# Patient Record
Sex: FEMALE
[De-identification: names, ages, dates, MRNs, and addresses within clinical notes are randomized per-mention and may not be internally consistent; named-entity substitution may affect disease eponyms.]

---

## 2019-10-02 NOTE — ULT
EXAM:

Obstetrical ultrasound greater than 14 weeks:



HISTORY:

Size and dates, anatomy



COMPARISON:

None.



FINDINGS:

Single viable intrauterine fetus is noted in transverse maternal right side presentation.

Fetal heart rate equals 143 bpm.

Placenta is anterior.

Cervical length is 6.8 cm.

Amniotic fluid is Within normal limits.



Fetal anatomy:

Visualized fetal brain, 4 chamber heart, chest, three-vessel cord, cord insert, stomach, bladder, kid
neys, spine, and extremity regions are unremarkable.





Fetal biometry:

BPD: 4.4 cm--19 weeks 2 days

Head circumference: 17.0 cm--19 weeks 5 days

Abdominal circumference: 14.0 cm--19 weeks 3 days

Femur length:2.9 cm--19 weeks 0 days



IMPRESSION:

Gestational age by ultrasound:           19 weeks 3 days

MEGAN by ultrasound:                             2/23/2020



Estimated fetal weight:                       278 g







Reported By: Mauri Singh 

Electronically Signed:  10/2/2019 11:57 AM

## 2020-02-13 ENCOUNTER — HOSPITAL ENCOUNTER (INPATIENT)
Dept: HOSPITAL 92 - L&D/OP | Age: 20
LOS: 10 days | Discharge: HOME | End: 2020-02-23
Attending: FAMILY MEDICINE | Admitting: FAMILY MEDICINE
Payer: COMMERCIAL

## 2020-02-13 VITALS — BODY MASS INDEX: 25.7 KG/M2

## 2020-02-13 DIAGNOSIS — E83.42: ICD-10-CM

## 2020-02-13 DIAGNOSIS — D62: ICD-10-CM

## 2020-02-13 DIAGNOSIS — I12.9: ICD-10-CM

## 2020-02-13 DIAGNOSIS — L76.32: ICD-10-CM

## 2020-02-13 DIAGNOSIS — E87.1: ICD-10-CM

## 2020-02-13 DIAGNOSIS — Z3A.38: ICD-10-CM

## 2020-02-13 DIAGNOSIS — O99.89: ICD-10-CM

## 2020-02-13 DIAGNOSIS — N18.3: ICD-10-CM

## 2020-02-13 DIAGNOSIS — N13.30: ICD-10-CM

## 2020-02-13 DIAGNOSIS — N17.9: ICD-10-CM

## 2020-02-13 LAB
ALBUMIN SERPL BCG-MCNC: 3.2 G/DL (ref 3.5–5)
ALP SERPL-CCNC: 464 U/L (ref 40–110)
ALT SERPL W P-5'-P-CCNC: 36 U/L (ref 8–55)
ANION GAP SERPL CALC-SCNC: 16 MMOL/L (ref 10–20)
AST SERPL-CCNC: 39 U/L (ref 5–34)
BILIRUB SERPL-MCNC: 1.1 MG/DL (ref 0.2–1.2)
BUN SERPL-MCNC: 30 MG/DL (ref 7–18.7)
CALCIUM SERPL-MCNC: 8.9 MG/DL (ref 7.8–10.44)
CHLORIDE SERPL-SCNC: 104 MMOL/L (ref 98–107)
CO2 SERPL-SCNC: 21 MMOL/L (ref 22–29)
CREAT CL PREDICTED SERPL C-G-VRATE: 50 ML/MIN (ref 70–130)
GLOBULIN SER CALC-MCNC: 2.8 G/DL (ref 2.4–3.5)
GLUCOSE SERPL-MCNC: 65 MG/DL (ref 70–105)
HBSAG INDEX: 0.23 S/CO (ref 0–0.99)
HGB BLD-MCNC: 11 G/DL (ref 12–16)
MCH RBC QN AUTO: 30.9 PG (ref 27–31)
MCV RBC AUTO: 87.6 FL (ref 78–98)
PLATELET # BLD AUTO: 156 THOU/UL (ref 130–400)
POTASSIUM SERPL-SCNC: 4.1 MMOL/L (ref 3.5–5.1)
PROT UR-MCNC: 26 MG/DL (ref 1–14)
RBC # BLD AUTO: 3.57 MILL/UL (ref 4.2–5.4)
SODIUM SERPL-SCNC: 137 MMOL/L (ref 136–145)
SYPHILIS ANTIBODY INDEX: 0.02 S/CO
WBC # BLD AUTO: 10.4 THOU/UL (ref 4.8–10.8)

## 2020-02-13 PROCEDURE — 85610 PROTHROMBIN TIME: CPT

## 2020-02-13 PROCEDURE — 85384 FIBRINOGEN ACTIVITY: CPT

## 2020-02-13 PROCEDURE — 74176 CT ABD & PELVIS W/O CONTRAST: CPT

## 2020-02-13 PROCEDURE — 83010 ASSAY OF HAPTOGLOBIN QUANT: CPT

## 2020-02-13 PROCEDURE — C1726 CATH, BAL DIL, NON-VASCULAR: HCPCS

## 2020-02-13 PROCEDURE — 86900 BLOOD TYPING SEROLOGIC ABO: CPT

## 2020-02-13 PROCEDURE — 87077 CULTURE AEROBIC IDENTIFY: CPT

## 2020-02-13 PROCEDURE — 85014 HEMATOCRIT: CPT

## 2020-02-13 PROCEDURE — 87040 BLOOD CULTURE FOR BACTERIA: CPT

## 2020-02-13 PROCEDURE — S0020 INJECTION, BUPIVICAINE HYDRO: HCPCS

## 2020-02-13 PROCEDURE — P9016 RBC LEUKOCYTES REDUCED: HCPCS

## 2020-02-13 PROCEDURE — 88307 TISSUE EXAM BY PATHOLOGIST: CPT

## 2020-02-13 PROCEDURE — 76856 US EXAM PELVIC COMPLETE: CPT

## 2020-02-13 PROCEDURE — 76815 OB US LIMITED FETUS(S): CPT

## 2020-02-13 PROCEDURE — 86850 RBC ANTIBODY SCREEN: CPT

## 2020-02-13 PROCEDURE — 71046 X-RAY EXAM CHEST 2 VIEWS: CPT

## 2020-02-13 PROCEDURE — 86901 BLOOD TYPING SEROLOGIC RH(D): CPT

## 2020-02-13 PROCEDURE — 81001 URINALYSIS AUTO W/SCOPE: CPT

## 2020-02-13 PROCEDURE — 3E033VJ INTRODUCTION OF OTHER HORMONE INTO PERIPHERAL VEIN, PERCUTANEOUS APPROACH: ICD-10-PCS | Performed by: FAMILY MEDICINE

## 2020-02-13 PROCEDURE — 84550 ASSAY OF BLOOD/URIC ACID: CPT

## 2020-02-13 PROCEDURE — 82570 ASSAY OF URINE CREATININE: CPT

## 2020-02-13 PROCEDURE — 84156 ASSAY OF PROTEIN URINE: CPT

## 2020-02-13 PROCEDURE — 80053 COMPREHEN METABOLIC PANEL: CPT

## 2020-02-13 PROCEDURE — 83615 LACTATE (LD) (LDH) ENZYME: CPT

## 2020-02-13 PROCEDURE — 85027 COMPLETE CBC AUTOMATED: CPT

## 2020-02-13 PROCEDURE — 36430 TRANSFUSION BLD/BLD COMPNT: CPT

## 2020-02-13 PROCEDURE — 76770 US EXAM ABDO BACK WALL COMP: CPT

## 2020-02-13 PROCEDURE — 93970 EXTREMITY STUDY: CPT

## 2020-02-13 PROCEDURE — 86780 TREPONEMA PALLIDUM: CPT

## 2020-02-13 PROCEDURE — 76819 FETAL BIOPHYS PROFIL W/O NST: CPT

## 2020-02-13 PROCEDURE — 85018 HEMOGLOBIN: CPT

## 2020-02-13 PROCEDURE — 80048 BASIC METABOLIC PNL TOTAL CA: CPT

## 2020-02-13 PROCEDURE — 85025 COMPLETE CBC W/AUTO DIFF WBC: CPT

## 2020-02-13 PROCEDURE — 82805 BLOOD GASES W/O2 SATURATION: CPT

## 2020-02-13 PROCEDURE — 85730 THROMBOPLASTIN TIME PARTIAL: CPT

## 2020-02-13 PROCEDURE — 36415 COLL VENOUS BLD VENIPUNCTURE: CPT

## 2020-02-13 PROCEDURE — 83735 ASSAY OF MAGNESIUM: CPT

## 2020-02-13 PROCEDURE — 99285 EMERGENCY DEPT VISIT HI MDM: CPT

## 2020-02-13 PROCEDURE — 85060 BLOOD SMEAR INTERPRETATION: CPT

## 2020-02-13 PROCEDURE — 36416 COLLJ CAPILLARY BLOOD SPEC: CPT

## 2020-02-13 PROCEDURE — 87086 URINE CULTURE/COLONY COUNT: CPT

## 2020-02-13 PROCEDURE — 83036 HEMOGLOBIN GLYCOSYLATED A1C: CPT

## 2020-02-13 PROCEDURE — 51702 INSERT TEMP BLADDER CATH: CPT

## 2020-02-13 PROCEDURE — 87340 HEPATITIS B SURFACE AG IA: CPT

## 2020-02-13 NOTE — ULT
RENAL ULTRASOUND:

2/13/20

 

COMPARISON: 

5/14/17.

 

HISTORY: 

Pregnancy. Elevated creatinine.

 

TECHNIQUE:  

Multiplanar gray scale sonographic imaging of the kidneys and urinary bladder obtained. 

 

FINDINGS: 

The right kidney measures approximately 8.9 x 3.2 x 3.4 cm, relatively small. No hydronephrosis is no
jenny on the right. Right renal parenchyma is echogenic, suspicious for possible renal medical disease.
 There are small cysts within the right kidney measuring in the 8-9 mm range. 

 

There is severe left sided hydronephrosis. Left kidney measures 8.8 x 4.5 x 4.3 cm. There is prominen
t dilation of the proximal left ureter. Prevoid urinary bladder volume is 254 mL. Postvoid urinary bl
adder volume is 62 mL. The hydronephrosis and hydroureter on the left persists on the postvoid imagin
g. 

 

IMPRESSION: 

1.      Prominent left sided hydronephrosis and hydroureter seen on pre and postvoid imaging. 

2.      Possible renal medical disease. 

3.      Hydronephrosis on the left discussed with Dr. Marshall at approximately 4:55 p.m., 2/13/20. 

 

 

Code CR 

 

POS: [facility]

## 2020-02-13 NOTE — ULT
FETAL BIOPHYSICAL PROFILE:

2/13/20

 

HISTORY: 

Diabetes, possible intrauterine growth retardation.

 

Please refer to the other findings in the OB report. The fetal biophysical profile score was 8 of a p
ossible 8. Fetal heart rate is 144 beats per minute.

 

IMPRESSION: 

Fetal biophysical profile score of 8 of a possible 8. 

 

POS: [facility]

## 2020-02-13 NOTE — PRG
DATE OF SERVICE:  02/13/2020



TIME OF SERVICE:  2120.



SUBJECTIVE:  Renal ultrasound was obtained on the Ms. Zepeda.  Findings by Dr. Geiger were discussed with myself at 5:00 pm this evening.  The findings include the

right kidney measuring 9 x 3 x 3.5 cm, relatively small with no hydronephrosis.

Echogenic renal parenchyma, suspicious for intrinsic renal disease with small cyst

in the right kidney measuring 8 to 9 mm in greatest diameter.  There was severe

left-sided hydronephrosis with a kidney measuring 8.8 x 4.5 x 4 cm.  There was

prominent dilatation of the proximal left ureter.  Urinary bladder imaging revealed

bilateral ureteral jets ruling out distal obstruction leading to hydronephrosis. 



While hydronephrosis is common in pregnancy, it is more common on the right than on

the patient's left due to pelvic anatomy.  It is unclear as to why the patient has

significant hydronephrosis.  Renal size is about the same as noted in 2017 on a

renal ultrasound when the patient had pyelo, however, the hydronephrosis was not

noted. 



I discussed the patient with Dr. Lela Turner.  We both agreed that the most prudent

course at this point in time would be to proceed with induction of labor for vaginal

delivery and re-evaluate hydronephrosis on the patient's left post delivery.  The

patient has been started on Cytotec at 15 mcg x1 in the vagina.  She has an

unfavorable cervix, but has had significant contractions that precluded a second

dose of Cytotec.  We will continue with Cytotec as allowed per protocol throughout

the night and anticipate Pitocin induction on 02/14.  Fetal heart rate tracing

remained category 1. 







Job ID:  [id number]

## 2020-02-13 NOTE — HP
TIME OF ADMISSION:  1500 hours.



REASON FOR ADMISSION:  Elevated blood pressures with proteinuria noted in the office

at 38 weeks gestation with an unfavorable cervix and gestational diabetes. 



HISTORY OF PRESENT ILLNESS:  Ms. Zepeda is a 20-year-old primigravida with EDC of

02/27.  She is seeing Lela Turner since 7 weeks gestation.  Pregnancy has been

complicated by recurrent UTIs, which the patient has a history of urosepsis outside

of pregnancy.  She has also developed diabetes with a 1-hour GTT of 235.  The

patient has been intermittently compliant with metformin at 500 b.i.d.  She was

noted in the office to have elevated blood pressures and also have 2+ proteinuria.

She is sent over for further evaluation. 



OB/GYN HISTORY:  As noted.  The patient's initial hematocrit was 37%.  She is not a

cystic fibrosis carrier.  She had abnormal 1-hour GTT and nonreactive hepatitis B.

Hemoglobin A1c has been normal at 4.9.  HIV was negative.  Blood type is O positive.

 Antibody negative.  Pap negative.  Rubella immune.  VDRL nonreactive.  The patient

had a positive urine culture for E coli in July and has a history of urosepsis. 



MEDICAL HISTORY:  The patient had urosepsis back in 2017.  Of note, her creatinine

at that time was up to 1.36, but it got down to about 1.1 after treatment. 



SURGICAL HISTORY:  Tubes and adenoids.



SOCIAL HISTORY:  Denies tobacco, alcohol, or IV drug use.



FAMILY HISTORY:  Noncontributory.



ALLERGIES:  DENIES.



MEDICATIONS:  Prenatal vitamins and Macrobid nightly and metformin.



PHYSICAL EXAMINATION:

GENERAL:   female resting comfortably. 

VITAL SIGNS:  Blood pressure 138/92, pulse 85, respirations 18, and temperature

98.8. 

HEENT:  Within normal limits. 

LUNGS:  Clear to auscultation bilaterally. 

HEART:  Regular rate and rhythm. 

ABDOMEN:  Her fundal height is 35 cm.  FHTs 140s.  Vulva without lesions.  Vaginal

exam deferred.  Cervix reported to be closed, long, and high at Dr. Turner's office. 

EXTREMITIES:  No clubbing, cyanosis, or edema.



DIAGNOSTIC STUDIES:  Ultrasound today revealed a biophysical profile of 8/8.  Fetus

is noted to be vertex presentation with an TOÑO of 18.  Composite gestational age of

35 weeks and 5 days with EFW of 2904, was less than a 50th percentile if not reached

the 5th percentile for IUGR for this gestational age. 



LABORATORY DATA:  Laboratory results include a hematocrit of 31% and a platelet

count of 156.  The patient has noted to have a BUN of 30, a creatinine of 1.68, and

a glucose is 65.  The patient is asymptomatic from this.  AST is slightly elevated

at 39, ALT is within normal limits, and alkaline phosphatase is 264.  The patient's

urine protein to creatinine ratio is approximately 1.3 with creatinine low at 21.15. 



IMPRESSION:  A 38 weeks small for dates gestation with gestational diabetes, on

metformin with elevated creatinine and elevated protein to creatinine ratio,

laboratory findings consistent with dehydration, and mildly elevated blood pressures

without headaches or scotoma. 



PLAN:  Discussed the patient's care of plan with Dr. Turner.  We will admit the

patient.  Bolus 1 L of lactated Ringer's.  Continue at 100 mL an hour.  Check a

24-hour urine and repeat labs in the morning.  We will perform serial blood

pressures on the floor.  We will get renal ultrasound to evaluate kidneys, and keep

the patient on Macrodantin at bedtime for UTI prophylaxis.  We will hold Glucophage

at this time and follow Accu-Cheks q.6 hours.  Dr. Turner will likely manage the

patient beginning 2:14 a.m. 







Job ID:  [id number]

## 2020-02-13 NOTE — ULT
Limited obstetrical ultrasound:

2/13/2020



COMPARISON: None



HISTORY: Possible intrauterine growth retardation, gestational diabetes



TECHNIQUE: The plantar grayscale sonographic imaging of the gravid uterus obtained.



FINDINGS: Single intrauterine gestation present with a vertex presentation. Placenta located anterior
ly with no evidence for placental previa or abruption. Fetal heart rate 144 bpm. Amniotic fluid

index 18.4 cm. Fetal anatomy not assessed on this exam.



Fetal biometry:

Biparietal diameter 8.5 cm 34 weeks 1 day

Head circumference 31.7 cm 35 weeks 5 days

Abdominal circumference 33.4 cm 37 weeks 2 days

Femur length 6.9 cm 35 weeks 3 days



Estimated fetal weight 2904 g +/- 430 g.



Average age based on ultrasound is 35 weeks 5 days. Estimated date of delivery is 3/14/2020.



IMPRESSION: Intrauterine gestation as detailed above. Average age based on ultrasound is 35 weeks 5 d
ays.



Reported By: Jonathan Geiger 

Electronically Signed:  2/13/2020 3:12 PM

## 2020-02-13 NOTE — ULT
EXAM:  US Bladder



DATE:  2/13/2020 12:00 AM



INDICATION:  Evaluate for ureteral jets



COMPARISON:  None.



FINDING:  Left and right ureteral jets are demonstrated on color Doppler imaging.





IMPRESSION:Demonstration of both left and right ureteral jets.



Reported By: Wicho Covarrubias 

Electronically Signed:  2/13/2020 6:14 PM

## 2020-02-14 LAB
ALBUMIN SERPL BCG-MCNC: 2.8 G/DL (ref 3.5–5)
ALP SERPL-CCNC: 402 U/L (ref 40–110)
ALT SERPL W P-5'-P-CCNC: 43 U/L (ref 8–55)
ANION GAP SERPL CALC-SCNC: 17 MMOL/L (ref 10–20)
AST SERPL-CCNC: 47 U/L (ref 5–34)
BILIRUB SERPL-MCNC: 1.4 MG/DL (ref 0.2–1.2)
BUN SERPL-MCNC: 31 MG/DL (ref 7–18.7)
CALCIUM SERPL-MCNC: 8.4 MG/DL (ref 7.8–10.44)
CHLORIDE SERPL-SCNC: 109 MMOL/L (ref 98–107)
CO2 SERPL-SCNC: 19 MMOL/L (ref 22–29)
CREAT 24H UR-MRATE: 850.85 MG/24 HR (ref 710–1650)
CREAT CL PREDICTED SERPL C-G-VRATE: 47 ML/MIN (ref 70–130)
GLOBULIN SER CALC-MCNC: 2.8 G/DL (ref 2.4–3.5)
GLUCOSE SERPL-MCNC: 98 MG/DL (ref 70–105)
POTASSIUM SERPL-SCNC: 4.1 MMOL/L (ref 3.5–5.1)
PROT 24H UR-MRATE: 1155 MG/24 HR (ref ?–300)
PROT UR-MCNC: 30 MG/DL (ref 1–14)
SODIUM SERPL-SCNC: 141 MMOL/L (ref 136–145)
SPECIMEN VOL UR: 3850 ML (ref 600–1600)

## 2020-02-14 RX ADMIN — MAGNESIUM SULFATE HEPTAHYDRATE SCH MLS: 500 INJECTION, SOLUTION INTRAMUSCULAR; INTRAVENOUS at 20:52

## 2020-02-14 RX ADMIN — SODIUM CHLORIDE SCH MLS: 0.9 INJECTION, SOLUTION INTRAVENOUS at 12:09

## 2020-02-14 RX ADMIN — Medication SCH ML: at 20:35

## 2020-02-14 NOTE — PDOC.EVN
Event Note





- Event Note


Event Note: 


Asked to AROM by Dr. Turner.


Comfortable with epidural.


SVE 4/50/-1, vtx.


FHTs stable.


AROM- meconium seen.


IUPC to be placed by Labor RN.

## 2020-02-15 LAB
ALBUMIN SERPL BCG-MCNC: 2 G/DL (ref 3.5–5)
ALBUMIN SERPL BCG-MCNC: 2.4 G/DL (ref 3.5–5)
ALP SERPL-CCNC: 273 U/L (ref 40–110)
ALP SERPL-CCNC: 374 U/L (ref 40–110)
ALT SERPL W P-5'-P-CCNC: 26 U/L (ref 8–55)
ALT SERPL W P-5'-P-CCNC: 39 U/L (ref 8–55)
ANALYZER IN CARDIO: (no result)
ANION GAP SERPL CALC-SCNC: 17 MMOL/L (ref 10–20)
ANION GAP SERPL CALC-SCNC: 17 MMOL/L (ref 10–20)
APTT PPP: 33.1 SEC (ref 22.9–36.1)
AST SERPL-CCNC: 34 U/L (ref 5–34)
AST SERPL-CCNC: 47 U/L (ref 5–34)
BASE EXCESS STD BLDA CALC-SCNC: -6.9 MEQ/L
BASE EXCESS STD BLDV CALC-SCNC: -6.6 MEQ/L
BILIRUB SERPL-MCNC: 1.7 MG/DL (ref 0.2–1.2)
BILIRUB SERPL-MCNC: 1.7 MG/DL (ref 0.2–1.2)
BUN SERPL-MCNC: 24 MG/DL (ref 7–18.7)
BUN SERPL-MCNC: 28 MG/DL (ref 7–18.7)
CALCIUM SERPL-MCNC: 7.4 MG/DL (ref 7.8–10.44)
CALCIUM SERPL-MCNC: 7.8 MG/DL (ref 7.8–10.44)
CHLORIDE SERPL-SCNC: 100 MMOL/L (ref 98–107)
CHLORIDE SERPL-SCNC: 102 MMOL/L (ref 98–107)
CO2 SERPL-SCNC: 17 MMOL/L (ref 22–29)
CO2 SERPL-SCNC: 18 MMOL/L (ref 22–29)
CREAT CL PREDICTED SERPL C-G-VRATE: 40 ML/MIN (ref 70–130)
CREAT CL PREDICTED SERPL C-G-VRATE: 44 ML/MIN (ref 70–130)
FIBRINOGEN PPP-MCNC: 554 MG/DL (ref 253–463)
GLOBULIN SER CALC-MCNC: 1.8 G/DL (ref 2.4–3.5)
GLOBULIN SER CALC-MCNC: 2.6 G/DL (ref 2.4–3.5)
GLUCOSE SERPL-MCNC: 119 MG/DL (ref 70–105)
GLUCOSE SERPL-MCNC: 144 MG/DL (ref 70–105)
HCO3 BLDA-SCNC: 21.4 MEQ/L (ref 22–28)
HCO3 BLDV-SCNC: 20 MEQ/L (ref 22–28)
HGB BLD-MCNC: 6.8 G/DL (ref 12–16)
INR PPP: 1
MAGNESIUM SERPL-MCNC: 9.4 MG/DL (ref 1.6–2.6)
MCH RBC QN AUTO: 31.4 PG (ref 27–31)
MCV RBC AUTO: 89.9 FL (ref 78–98)
MDIFF COMPLETE?: YES
PH BLDV: 7.28 [PH] (ref 7.32–7.43)
PLATELET # BLD AUTO: 131 THOU/UL (ref 130–400)
POLYCHROMASIA BLD QL SMEAR: (no result) (100X)
POTASSIUM SERPL-SCNC: 4 MMOL/L (ref 3.5–5.1)
POTASSIUM SERPL-SCNC: 4.7 MMOL/L (ref 3.5–5.1)
PROTHROMBIN TIME: 13.6 SEC (ref 12–14.7)
RBC # BLD AUTO: 2.15 MILL/UL (ref 4.2–5.4)
RBC UR QL AUTO: (no result) HPF (ref 0–3)
REFLEX FOR REVIEW??: YES
SODIUM SERPL-SCNC: 130 MMOL/L (ref 136–145)
SODIUM SERPL-SCNC: 132 MMOL/L (ref 136–145)
WBC # BLD AUTO: 22.4 THOU/UL (ref 4.8–10.8)
WBC UR QL AUTO: (no result) HPF (ref 0–3)

## 2020-02-15 RX ADMIN — SODIUM CHLORIDE SCH: 0.9 INJECTION, SOLUTION INTRAVENOUS at 19:11

## 2020-02-15 RX ADMIN — MAGNESIUM SULFATE HEPTAHYDRATE SCH MLS: 500 INJECTION, SOLUTION INTRAMUSCULAR; INTRAVENOUS at 05:27

## 2020-02-15 RX ADMIN — ACETAMINOPHEN AND CODEINE PHOSPHATE PRN TAB: 300; 30 TABLET ORAL at 21:07

## 2020-02-15 RX ADMIN — Medication SCH ML: at 06:21

## 2020-02-15 RX ADMIN — MAGNESIUM SULFATE HEPTAHYDRATE SCH: 500 INJECTION, SOLUTION INTRAMUSCULAR; INTRAVENOUS at 19:09

## 2020-02-15 NOTE — ULT
Exam: Bilateral renal ultrasound



HISTORY: Anuria. Elevated creatinine.



COMPARISON: 2/13/2020



FINDINGS: 

Right kidney: Normal cortical echotexture. No hydronephrosis. There is right renal cortical thinning.


Right kidney measurements: 3.7 x 4.0 x 8.3 cm. 

Left kidney: Normal cortical echotexture. Moderate left-sided hydronephrosis.

Left kidney measurements 4.4 x 4.1 x 8.4 cm. 



Urinary bladder: Not be assessed due to bandage overlying the location of the bladder.

Trace amount of fluid in the right upper quadrant is noted





IMPRESSION: 

1. Improved left-sided hydronephrosis. Currently, there is moderate left-sided hydronephrosis.

2. Right renal cortical thinning. Correlate for medical renal disease.

3. Trace amount of fluid in the right upper quadrant.



Transcribed Date/Time: 2/15/2020 7:01 PM



Reported By: Leila Rodriguez 

Electronically Signed:  2/15/2020 7:06 PM

## 2020-02-15 NOTE — OP
DATE OF PROCEDURE:  02/15/2020



PREOPERATIVE DIAGNOSES:  

1. Term intrauterine pregnancy with acute versus chronic renal failure.

2. Gestational hypertension.

3. Gestational diabetes.

4. Nonreassuring fetal heart tones.



POSTOPERATIVE DIAGNOSES:  

1. Term intrauterine pregnancy with acute versus chronic renal failure.

2. Gestational hypertension.

3. Gestational diabetes.

4. Nonreassuring fetal heart tones.

5. Status post delivery.



PROCEDURE PERFORMED:  Primary low-transverse  section.



ASSISTANT:  Ronal Staley MD



ANESTHESIA:  Epidural anesthetic.



COMPLICATIONS:  No complications.



DESCRIPTION OF PROCEDURE:  After adequate spinal anesthetic, the patient had been

placed in the supine position.  A wedge was placed under her right flank.  The

abdomen was prepped and draped in usual sterile technique.  A Pfannenstiel incision

was made in the inferior aspect of the abdomen.  Subcutaneous tissue was opened with

sharp dissection.  Fascia was opened with sharp dissection.  Peritoneum opened with

sharp and blunt dissection.  It was noted that the abdomen was still with a gravid

uterus.  A bladder blade was placed and a low-transverse incision was made on the

uterus.  Noted, moderate meconium fluid.  A viable male infant was delivered from OP

vertex presentation without difficulty.  Infant breathed and cried spontaneously.

Cord was clamped and cut and infant was handed to the care of the Neonatology Team.

A section of the cord was excised and sent for cord gases and additional cord blood

was obtained.  The placenta was delivered manually and appeared intact.  The uterus

was taken external to the abdominal cavity.  Wet lap was placed over the uterus and

a second lap used to wipe and clean the uterus.  Hysterotomy edges were grasped with

ring forceps and the hysterotomy was then closed in one layer in continuous fashion

using 0 Monocryl.  Sponge and instrument counts were correct.  Hemostasis was

adequate.  There was no bleeding from the hysterotomy edges.  Two small bleeders

were cauterized on the bladder flap.  The wound was inspected.  There were no clots

in the gutters.  No additional bleeding.  Noted that there was a buttonhole defect

in the anterior fascia, which was repaired with a figure-of-eight fashion with 0

Monocryl and then the fascia was then closed in continuous fashion using 0 Monocryl.

 Sponge and instrument counts were correct.  Few bleeders were cauterized on the

subcutaneous tissue and this were then closed with 2-0 plain suture in a running

fashion and the skin was closed using staples.  The patient tolerated the procedure

well, to go to recovery room in good condition.  Noted that the baby is a viable

male infant in level I nursery, weight 6 pounds 9 ounces with Apgar 7 at one minute,

9 at five minutes and the patient to go to recovery room and subsequently LICU to

continue magnesium sulfate. 







Job ID:  [id number]

## 2020-02-15 NOTE — CT
Exam: Abdomen CT without contrast

Pelvic CT without contrast



HISTORY: Chronic renal failure. Hydronephrosis. Postpartum patient.



FINDINGS:

Abdomen CT: Small bilateral effusion. Bibasilar consolidation may represent atelectasis.

Heart size is normal. No significant pericardial fluid. Aorta is unremarkable

Limited evaluation of the solid organs by the lack of IV contrast. There is no solid organ abnormalit
y.

Gallbladder is unremarkable

There is free fluid in the abdomen, tracking into the pelvis.

Nonobstructing calculus in the upper pole the right kidney measures 1 mm. No significant right sided 
obstructive uropathy. Moderate left sided hydronephrosis and hydroureter. No evidence of associated

obstructing calculus. Evaluation of the left ureter is limited by decreased visceral fat.

No mesenteric mass, lymphadenopathy. There are small pockets of free air in the lower pelvic mesenter
y, compatible with recent  section.

Limited evaluation of the alimentary canal by the lack of oral contrast. No evidence of bowel obstruc
tion.



Pelvic CT: Heterogeneous, enlarged uterus compatible with a postpartum state. There are postoperative
 changes compatible with a  section. There are pockets of free air and fluid within the

peritoneal cavity as well as within the subcutaneous fat and ventral abdominal musculature. Skin stap
les are noted.

Brown catheter decompresses the urinary bladder.

No lytic or blastic lesions in the osseous structures



IMPRESSION:

1. Findings compatible with recent  section.

2. Free fluid in the abdomen or pelvis.

3. Moderate right-sided hydronephrosis and hydroureter.



Transcribed Date/Time: 2/15/2020 8:03 PM



Reported By: Leila Rodriguez 

Electronically Signed:  2/15/2020 8:25 PM

## 2020-02-16 LAB
ALBUMIN SERPL BCG-MCNC: 2 G/DL (ref 3.5–5)
ALP SERPL-CCNC: 225 U/L (ref 40–110)
ALT SERPL W P-5'-P-CCNC: 19 U/L (ref 8–55)
ANION GAP SERPL CALC-SCNC: 14 MMOL/L (ref 10–20)
APTT PPP: 33.8 SEC (ref 22.9–36.1)
AST SERPL-CCNC: 30 U/L (ref 5–34)
BILIRUB SERPL-MCNC: 0.9 MG/DL (ref 0.2–1.2)
BUN SERPL-MCNC: 31 MG/DL (ref 7–18.7)
CALCIUM SERPL-MCNC: 8 MG/DL (ref 7.8–10.44)
CHLORIDE SERPL-SCNC: 103 MMOL/L (ref 98–107)
CO2 SERPL-SCNC: 19 MMOL/L (ref 22–29)
CREAT CL PREDICTED SERPL C-G-VRATE: 37 ML/MIN (ref 70–130)
GLOBULIN SER CALC-MCNC: 2.4 G/DL (ref 2.4–3.5)
GLUCOSE SERPL-MCNC: 75 MG/DL (ref 70–105)
HGB BLD-MCNC: 5.6 G/DL (ref 12–16)
HGB BLD-MCNC: 7.3 G/DL (ref 12–16)
HGB BLD-MCNC: 8.5 G/DL (ref 12–16)
HGB BLD-MCNC: 8.7 G/DL (ref 12–16)
INR PPP: 1
MCH RBC QN AUTO: 31.3 PG (ref 27–31)
MCV RBC AUTO: 90.8 FL (ref 78–98)
MDIFF COMPLETE?: YES
PLATELET # BLD AUTO: 146 THOU/UL (ref 130–400)
POTASSIUM SERPL-SCNC: 4.9 MMOL/L (ref 3.5–5.1)
PROTHROMBIN TIME: 13 SEC (ref 12–14.7)
RBC # BLD AUTO: 1.77 MILL/UL (ref 4.2–5.4)
SODIUM SERPL-SCNC: 131 MMOL/L (ref 136–145)
WBC # BLD AUTO: 20.3 THOU/UL (ref 4.8–10.8)

## 2020-02-16 RX ADMIN — Medication SCH ML: at 20:55

## 2020-02-16 RX ADMIN — MAGNESIUM SULFATE HEPTAHYDRATE SCH: 500 INJECTION, SOLUTION INTRAMUSCULAR; INTRAVENOUS at 15:42

## 2020-02-16 RX ADMIN — DOCUSATE CALCIUM SCH: 240 CAPSULE, LIQUID FILLED ORAL at 15:42

## 2020-02-16 RX ADMIN — HYDROCODONE BITARTRATE AND ACETAMINOPHEN PRN TAB: 5; 325 TABLET ORAL at 18:12

## 2020-02-16 RX ADMIN — DOCUSATE CALCIUM SCH MG: 240 CAPSULE, LIQUID FILLED ORAL at 21:06

## 2020-02-16 NOTE — CON
DATE OF CONSULTATION:  2020



REASON FOR CONSULTATION:  

1. Renal insufficiency, history of UTI.

2. Decreased urine output, renal insufficiency.



HISTORY OF PRESENT ILLNESS:  Ms. Zepeda is a 20-year-old female, followed by Dr. Turner.  She at 38 weeks, underwent  due to concern for preeclampsia.

Preop urine demonstrated proteinuria.  The patient is hypertensive.  She is 
known to

have renal insufficiency prior to her .  Two days before her 
, her

creatinine is 1.6.  Her baseline creatinine is anywhere from 1.0 to 1.3.  I did

review her previous imaging, dating back to May 2017, in which she had no 
evidence

of hydronephrosis, punctate right renal calculi with creatinine of 1.3, in 
which she

presented with pyelonephritis/urosepsis.  She denies prior history of urologic

assessment.  However, does have family history of renal failure.

 at bedside.  Delivery of a healthy infant boy.  Nephrology has been

consulted.  Subsequently, Urology was consulted due to decreased urine output.  
Her

 urine output did drop to 35 to 50 mL an hour.  Since the events of decreased

urine output, a renal ultrasound was obtained, as well as CT of the abdomen and

pelvis x2 for staging as there is also concern for acute blood loss due to 
postop

anemia of 6.8 and 5.3 this morning.  She is hemodynamically stable.  I reviewed 
all

her imaging back to .  Family at bedside.  I did order a stat transvaginal

abdominal ultrasound to assess for ureteral jets.  The patient currently resting

comfortably. 



PAST MEDICAL HISTORY:  History of E coli urosepsis in May 2017.  On 2019
,

she did have E coli.  Her recent urine culture on  is group B beta

Streptococcus and has been on Macrobid. 



PAST SURGICAL HISTORY:  T and A, recent  in February 15 by Dr. Turner.
  Per

her, surgery was uneventful. 



SOCIAL HISTORY:  Denies illicit drug use.



FAMILY HISTORY:  Positive for end-stage renal disease.



ALLERGIES:  DENIES ALLERGIES.



MEDICATIONS:  She has been taking nightly Macrobid, metformin at home.



PHYSICAL EXAMINATION:

GENERAL:  The patient is in no acute distress.  Appears nontoxic appearing.

Hemodynamically stable. 

VITAL SIGNS:  Stable at 98.7, 18, 97, 96, 114/65.  I's and O's; her decreased 
urine

output of 35 to 50 mL an hour has significantly picked up over the last 6 hours 
of

hourly urine output of 100, 285, 480, 380, 230 per hourly. 



HEART:  Regular rate. 

LUNGS:  Clear. 

ABDOMEN:  Gravid abdomen due to recent  incision, staples are intact. 

GENITOURINARY:  Demonstrates labial edema.  Brown catheter demonstrating clear

yellow urine.  Ultrasound tech is at bedside. 

Extremities no cyanosis clubbing or edema no calf tenderness

Neurologic: No gross focal deficits per se



PERTINENT LABORATORY AND IMAGING DATA:  On postop day #0, white count of 22,

hemoglobin 6.8, previously her hemoglobin was 12, platelet 131.  Recheck H and H

this morning is 5.6, white count 20, platelet 146.  Coagulation profile is

unremarkable.  Fibrinogen is elevated at 726. 



BMP:  Sodium 131; potassium 4.9; creatinine is 2.2, yesterday 2.0, on February 
15

day of , 1.9, on  of 1.7, on  of 1.6.  Her 
baseline

creatinine is 1.0.  When presenting with urosepsis in May 2017, highest 
creatinine

level was 1.3.  Her recent UA straight cath is clear.  No evidence of 
proteinuria.

Previous urine from  does demonstrate 30 proteinuria. 



 2020, renal bladder ultrasound demonstrates bilateral ureteral 
jets.

 Ultrasound demonstrating left hydronephrosis. 

 February 15, 2020, repeat renal ultrasound demonstrates improved left

hydronephrosis, right renal cortical thinning. 



CT  February 15, 2020, finding consistent with recent .  Nonobstructing

right upper pole renal calculi.  There is no evidence of right 

hydroureteronephrosis.  Moderate left hydroureteronephrosis with no evidence of

ureteral calculi, changes consistent with recent  with hemoperitoneum. 



Repeat staging CT obtained by OB Service on 2020 a.m. demonstrates

hyperdense fluid in the left paracolic gutter stable consistent with 
hemoperitoneum,

stable right punctate renal calculi with no evidence of hydronephrosis, left

hydroureteronephrosis as previous. 



IMPRESSION AND PLAN:  

1. Ms. Zepeda is a 20-year-old female, G1, P1 with urologic issues of 
Escherichia

coli urosepsis back in 2017. 

2. History of right punctate renal lithiasis, nonobstructing.

3. Current admission on postop day #1, status post  section due to 
concern

for preeclampsia, proteinuria. 

4. Oliguria, improved with observation.

5. History of left hydronephrosis. 



This is likely physiologic due to her pregnancy as we have documented on

transvaginal and abdominal ultrasound today with bilateral efflux of urine.

Moreover, her urine output is significantly improved over the last few hours,

therefore no surgical intervention is warranted.  Her oliguria and acute renal

insufficiency are likely multifactorial due to recent acute blood loss,

NSAID/Toradol use intraoperatively, likely prerenal as well.  There is no 
surgical

intervention warranted at this time.  Monitor creatinine, continue indwelling 
Brown

catheter.  Due to the patient's clinical status, Dr. Turner plans to transfer

patient to an ICU setting as there was concern for acute blood loss.   will

follow.  Extensive time spent with patient at bedside, reviewing all imaging 
and labs.







Job ID:  [id number]



JOHN

## 2020-02-16 NOTE — CON
DATE OF CONSULTATION:  



HISTORY OF PRESENT ILLNESS:  Izabel Zepeda is a 20-year-old female who was

delivered by  with preeclampsia prior to her delivery.  She has apparently

chronic kidney disease.  She has one small kidney, one kidney with mild

hydronephrosis.  She dropped her hemoglobin from 11 g on the th to 6.8 g yesterday

and 5.6 g this morning.  She had 45% bands on her peripheral smear on the  and

56% bands on her smear today. 



She has had 2 CTs yesterday and today that show peritoneal blood. 



She has no complaints.  She is a very poor historian, would not turn off or put down

her cellphone while I was talking to her, examining her.  She said she feels fine,

but just said "don't push on my belly." 



PAST MEDICAL HISTORY:  Remarkable for:

1. Diabetes with marginal compliance with medication.

2. History of elevated blood pressures and proteinuria when followed as an

outpatient. 

3. History of urinary tract sepsis in 2017 with renal insufficiency noted at that

time. 

She is supposed to be on Macrobid and metformin prior to admission.



REVIEW OF SYSTEMS:  Otherwise negative.



FAMILY HISTORY:  Positive for end-stage renal disease.



PHYSICAL EXAMINATION:

VITAL SIGNS:  Blood pressure is 118/59, heart rate is 100, respiratory rate is 18,

oximetry is 96%, and temperature is 99.7. 

HEENT:  Pupils are equal.  Sclerae are anicteric. 

NECK:  Supple. 

LUNGS:  Clear. 

HEART:  Regular rhythm. 

ABDOMEN:  Soft and tender as expected after . 

EXTREMITIES:  Without asymmetry.



LABORATORY DATA:  White count 20.3, hemoglobin 5.6 and 8.7 after 2 units of packed

cells, platelets 146,000. 



Acute on chronic kidney disease with renal cortical thinning seen in the right

kidney suggesting chronic kidney disease. 



Her hydronephrosis on the left is persistent on ultrasound on the .  Urology is

following. 



Urine culture from yesterday is negative.



IMPRESSION:  

1. Blood loss anemia.

2. Preeclampsia leading to a .

3. Chronic kidney disease with a renal cortical thinning on the right and

hydronephrosis on the left, likely related to her pregnancy.  She has been followed

by Urology. 

Her left shift is a little concerning, but given her recent stresses, she could

entirely de-marginate white cells just related to the stress of her illness.  This

will need to be followed and she becomes febrile.  Cultures with empiric

broad-antimicrobial therapy would be the next step. 



Nephrology has been consulted.  She is being hydrated with saline. 



The Macrodantin probably is not doing a lot at this point in time and could be held.

 We will follow. 



I would recommend serial H and Hs every 4 hours just to make sure she is not

bleeding. 







Job ID:  [id number]

## 2020-02-16 NOTE — PRG
DATE OF SERVICE:  



SUBJECTIVE:  A 20-year-old female being seen for acute kidney injury.  The patient

denied nausea, vomiting. Or chest pain. 



OBJECTIVE:  CONSTITUTIONAL:  On exam, the patient is awake and alert. 

VITAL SIGNS:  Afebrile, pulse 85, breathing 16, and blood pressure 126/74. 

GENERAL APPEARANCE AND MENTAL STATUS:  Fair. 

HEAD/NECK:  Normocephalic.  Atraumatic. 

EYES:  EOMI.  No deformity. 

EARS:  Clear.  No ulcers. 

NOSE:  Intact.  No lesions. 

MOUTH:  Clear.  No discharge. 

THROAT:  Clear.  No exudate. 

LUNGS:  Clear.  No crackles. 

CARDIAC:  S1, S2.  No rub. 

ABDOMEN:  Benign.  Bowel sounds positive. 

GENITALIA/RECTUM:  Brown absent. 

BACK/EXTREMITIES:  Edema 0+. 

NEUROLOGICAL:  Alert and motor intact. 

SKIN:   

LYMPHATICS:



LABORATORY DATA:  Hemoglobin 8.7.  Creatinine 2.2.



ASSESSMENT AND PLAN:  

1. Acute kidney injury with chronic kidney disease, stage 4, most likely due to

progressive chronic ischemic nephropathy in the setting of pregnancy and its

underlying stress.  Continue hydration. 

2. Obstructive uropathy.  Management per Urology.

3. Hypertension, stable.

4. Anemia, stable.

5. Hypoalbuminemia.  Recommend increased protein intake.

6. Hypermagnesemia, improving. 



No indication for dialysis at this time.  The patient is non-oliguric.







Job ID:  [id number]

## 2020-02-16 NOTE — CON
DATE OF CONSULTATION:  02/15/2020



REFERRING PHYSICIAN:  Lela Turner MD.



CHIEF COMPLAINT:  Preeclampsia with acute kidney injury or with anuria.



HISTORY:  The patient is a 20-year-old female who was diagnosed with preeclampsia

with severe features, with gestational diabetes.  The patient's labor course ended

in primary  for nonreassuring fetal heart tones.  Of note, on arrival, the

patient was noted to have elevated creatinine of 1.7 or 1.68.  Postoperatively, the

patient became anuric with a rising creatinine.  Nephrology was consulted and on

evaluation, Dr. Dumas believes the patient has underlying kidney disease with

scarring and strong family history of likely contributing to the patient's current

condition.  The patient had been placed on magnesium postdelivery and was noted to

have, when she became anuric to have, a magnesium level of 8.4.  Magnesium was

discontinued and magnesium had peaked at 9.4, and has been coming down on its own.

Differential at this point in time from an OB-related standpoint would be

preeclampsia with acute kidney disease, which can occur 1% to 3% of the time, HELLP

syndrome, which has an acute kidney injury a little more often at 3% to 10% or acute

fatty liver, rare though is very common to have kidney injury.  Lab work does not

point to acute fatty liver as the patient's coags are normal.  Serum glucose has

been appropriate and her LFTs have been trending down.  HELLP syndrome labs

demonstrate a normal platelet count.  It shows a dropping hemoglobin went from 11.0

at the time of arrival to 6.8 postoperatively.  Of note, the surgery had very well

controlled bleeding with no complications and quantitative blood loss 200 to 300 mL.

 LDH was elevated at 300, does not seem excessively high.  Her total bilirubin is

elevated at 1.7.  On the morning of , the patient's blood counts dropped

further to 5.6, hematocrit 16.1, and platelets had elevated up to 146.  Vital signs

through the night have been stable with blood pressures in the 100s/50s.  Urine

output steady at 30 to 85 mL an hour and clearing.  Repeat CT has been ordered this

morning as well as repeat coags, fibrinogen, uric acid and LDH to look for further

signs of hemolysis.  The patient's total fluid status needs to be evaluated since

time of admission; however, over postoperatively, the patient is positive, we will

take at least a couple of liters.  When CT scan is performed, we will have a better

idea of we need to proceed to surgery for resolution of bleeding.  She is receiving

1 unit of packed red blood cells now and will be kept a close eye on her.  Urology

is planning on coming to evaluate.  Nephrology is following her acute kidney failure

and Dr. Lela Turner, is primary provider for Ms. Zepeda.  Evidence points to

preeclampsia versus HELLP syndrome with preeclampsia being most likely and acute

kidney failure on top of chronic disease that could be associated with her disease

process versus medication usage as the patient received a dose of Toradol in the OR

versus HELLP syndrome versus acute blood loss anemia.  Hopefully, this morning in

short while we will be able to make more sense of what is occurring and make

appropriate decisions. 







Job ID:  [id number]

## 2020-02-16 NOTE — PDOC.EVN
Event Note





- Event Note


Event Note: 





labs this morning sig for h/h down to 5.6/16 from 6.8/19.  platelets stable at 

146 up from 135.  t bili down to 0.9 from 1.7.  creatinine continues to climb. 

2.29 from 2.09.  pt has preeclampsia with acute kidney injury on top of chronic 

kidney disease. uo has been steady at 30-85cc/hr. and clearing.  vitals have 

been stable with bp in 100s/50's pulse in the 80s.  Since attempting to get 

second IV pt has increase in bp and pulse.  this is blood loss from surgery vs 

hemolysis from HELLP.   repeat coags, haptoglobin to eval for hemolysis, ldh, 

uric acid.  repeat CT this morning. PT getting a unit of prbcs. fibrinogen.





estimated total iv fluids since admission is 8 liters.  urine output since 

friday evening is documented at 1699cc. i am not sure the urine out put prior 

to placement of the collazo








currently pt appears to be entering her diuresis phase with uo acutely rising, 

also pointing aware from acute blood loss post operatively. last hour is 245cc 

an hour.

## 2020-02-16 NOTE — CON
DATE OF CONSULTATION:  02/15/2020



REASON FOR CONSULTATION:  Elevated creatinine.



HISTORY OF PRESENTING ILLNESS:  This is a very pleasant 20-year-old female who

presented to the hospital for a delivery.  Her baseline creatinine was 1.6 on

admission, which increased to 1.9.  So I was consulted.  The patient has had

multiple episodes of __________ with acute kidney injury and her renal imaging a few

days ago showed renal scarring on the right side and hydronephrosis on the left

side.  The patient is very somnolent and can give no further history.  The patient's

magnesium level was 7.7 on examination, which has been trending downward.  The

patient is not bradycardic. 



PAST MEDICAL HISTORY:  Significant for chronic kidney disease, a very strong family

history of end-stage renal disease, history of recurrent UTIs, possibly urosepsis,

two episodes of acute kidney injury, history of tubes and adenoids. 



SOCIOECONOMIC HISTORY:  No alcohol or drug use.



FAMILY HISTORY:  Negative for ESRD.



ALLERGIES:  REVIEWED.



MEDICATIONS:  Home medications, reviewed.  Hospital medications, reviewed.



REVIEW OF SYSTEMS:  Unobtainable.  Patient very somnolent.



PHYSICAL EXAMINATION:

See above. 

GENERAL:  Patient is resting. 

VITAL SIGNS:  Afebrile, pulse 75, breathing 16, blood pressure 116/57. 

GENERAL APPEARANCE AND MENTAL STATUS:  Fair. 

HEAD/NECK:  Normocephalic.   Atraumatic. 

EYES:  EOMI.  No deformity. 

EARS:  Clear.  No ulcers. 

NOSE:   Intact.  No lesions. 

MOUTH:  Clear.  No discharge. 

THROAT:  Clear.  No exudate. 

LUNGS:   Clear.  No crackles. 

CARDIAC:   S1, S2.  No rub. 

ABDOMEN:   Benign.  Bowel sounds positive. 

GENITALIA/RECTUM:  Brown absent. 

BACK/EXTREMITIES:  Edema 0+.    

NEUROLOGICAL:  Alert and motor intact.                      

SKIN:   

LYMPHATICS:



LABORATORY DATA:  Reviewed.



ASSESSMENT AND PLAN:  

1. Acute kidney injury with chronic kidney disease, multifactorial.  The patient has

chronic kidney disease stage 3, which is very advanced kidney disease for a young

pregnant female.  So I would recommend changing the IV fluids to normal saline and

also consulting Urology for possible hydronephrosis.  The patient's right kidney

shows chronic scarring, so the patient could have chronic vesicoureteral reflux. 

2. Hypertension, stable.

3. Elevated magnesium level.  Continue hydration and use Lasix as needed.

4. Anuria.  Use Lasix __________.  All the above findings were discussed with the

primary team and the residents as well as other attending physicians.  No indication

for dialysis at this time. 

Please note, the patient was seen at 6 p.m. yesterday after a call from Dr. Turner.







Job ID:  [id number]

## 2020-02-16 NOTE — CT
CT ABDOMEN AND PELVIS WITHOUT CONTRAST:

 

2020

 

HISTORY:

Recent . Concern for bleeding.

 

COMPARISON:

02/15/2020

 

TECHNIQUE:

Axial CT imaging at 5 mm intervals from the lung bases through the pubic symphysis without contrast.

 

Coronal and sagittal reformatted imaging obtained.

 

FINDINGS:

Evaluation of the viscera, bowel and vascular structures and for lymphadenopathy is limited without c
ontrast media. The imaged lung base demonstrate increased linear density within the posteromedial asp
ects of both lower lobes, suggesting volume loss.

 

The liver is grossly unremarkable. There is stable nonspecific distention of the gallbladder. Stable 
small volume free fluid is seen adjacent to the right lobe of the liver, anterolaterally. There is fr
ee fluid inferior to the tip of the right lobe of the liver, some of which is hyperdense, consistent 
with hemoperitoneum. The spleen appears grossly unremarkable. There is hyperdense fluid within the le
ft paracolic gutter, along the course of the descending colon, consistent with hemoperitoneum in this
 region as well. This is stable when compared to the 02/15/2020 exam. Limited assessment of the pancr
eas and bilateral adrenal glands appears unremarkable. Nonspecific poorly assessed/evaluated left-anton
ed hydronephrosis and proximal hydroureter. Punctate nonobstructing stone upper pole right kidney, st
able.

 

Consistent with the patient's history of a recent  section, horizontally oriented cutaneous s
taples are noted inferiorly. Numerous foci of gas noted throughout the rectus abdominis musculature, 
consistent with recent surgical procedure. Brown catheter present within a mildly prominent urinary b
ladder. There is fluid within the urinary bladder and an air-fluid level. Recommend evaluation for Fo
jeny catheter malfunction.

 

The uterus is markedly enlarged and heterogeneous, Internal contents of the uterus are heterogeneous 
and hyperdense with intermixed gas, consistent with a degree of hemorrhage within the endometrial can
al. In the endocervical canal there is expansion with hyperdense material and gas, also consistent wi
th hemorrhage, not significantly changed. There is a hematoma within the pelvis, anterior to the lowe
r uterine segment and superior to the urinary bladder, measuring 9.1 x 3.6 cm, not significantly jefferson
ged. There is free fluid within the pelvis bilaterally with hemorrhage, as evidenced by multifocal ar
eas of hyperdensity within the free pelvic fluid anteriorly, as before. Limited assessment of the bow
el demonstrates no convincing evidence for obstruction. Vascular structures are poorly assessed witho
ut contrast medial.

 

The osseous structures demonstrate no acute findings.

 

IMPRESSION:

Findings consistent with recent  section. There is free fluid in the left upper quadrant, sasha
ateral paracolic gutters, and within the pelvis with intermixed areas of hyperdensity, consistent wit
h extensive stable hemoperitoneum. There is a stable hematoma within the pelvis, anterior to the lowe
r uterine segment and superior to the urinary bladder, measuring 9.1 x 3.6 cm. Additional stable find
ings as detailed above.

 

CODE T

 

POS: [facility]

## 2020-02-16 NOTE — ULT
EXAM: Pelvic ultrasound



HISTORY: Pelvic pain



COMPARISON: None



TECHNIQUE: Multiple grayscale and color Doppler images were obtained in a transabdominal and transvag
inal pelvic ultrasound.



FINDINGS:

Limited visualization urinary bladder was performed. A catheter is seen in the bladder. No focal abno
rmalities seen in the urinary bladder. Both ureteral jets were visualized.



IMPRESSION: Both ureteral jets visualized.



Reported By: Michele Sandoval 

Electronically Signed:  2/16/2020 10:25 AM

## 2020-02-17 LAB
ALBUMIN SERPL BCG-MCNC: 2.5 G/DL (ref 3.5–5)
ALP SERPL-CCNC: 241 U/L (ref 40–110)
ALT SERPL W P-5'-P-CCNC: 14 U/L (ref 8–55)
ANION GAP SERPL CALC-SCNC: 15 MMOL/L (ref 10–20)
ANION GAP SERPL CALC-SCNC: 15 MMOL/L (ref 10–20)
AST SERPL-CCNC: 22 U/L (ref 5–34)
BILIRUB SERPL-MCNC: 0.7 MG/DL (ref 0.2–1.2)
BUN SERPL-MCNC: 33 MG/DL (ref 7–18.7)
BUN SERPL-MCNC: 33 MG/DL (ref 7–18.7)
CALCIUM SERPL-MCNC: 8.4 MG/DL (ref 7.8–10.44)
CALCIUM SERPL-MCNC: 8.8 MG/DL (ref 7.8–10.44)
CHLORIDE SERPL-SCNC: 111 MMOL/L (ref 98–107)
CHLORIDE SERPL-SCNC: 111 MMOL/L (ref 98–107)
CO2 SERPL-SCNC: 18 MMOL/L (ref 22–29)
CO2 SERPL-SCNC: 19 MMOL/L (ref 22–29)
CREAT CL PREDICTED SERPL C-G-VRATE: 39 ML/MIN (ref 70–130)
CREAT CL PREDICTED SERPL C-G-VRATE: 39 ML/MIN (ref 70–130)
GLOBULIN SER CALC-MCNC: 3.2 G/DL (ref 2.4–3.5)
GLUCOSE SERPL-MCNC: 104 MG/DL (ref 70–105)
GLUCOSE SERPL-MCNC: 96 MG/DL (ref 70–105)
HGB BLD-MCNC: 7.4 G/DL (ref 12–16)
HGB BLD-MCNC: 7.8 G/DL (ref 12–16)
HGB BLD-MCNC: 8.5 G/DL (ref 12–16)
MCH RBC QN AUTO: 31.4 PG (ref 27–31)
MCV RBC AUTO: 90.8 FL (ref 78–98)
MDIFF COMPLETE?: YES
PLATELET # BLD AUTO: 212 THOU/UL (ref 130–400)
POLYCHROMASIA BLD QL SMEAR: (no result) (100X)
POTASSIUM SERPL-SCNC: 4.4 MMOL/L (ref 3.5–5.1)
POTASSIUM SERPL-SCNC: 4.5 MMOL/L (ref 3.5–5.1)
RBC # BLD AUTO: 2.71 MILL/UL (ref 4.2–5.4)
SODIUM SERPL-SCNC: 140 MMOL/L (ref 136–145)
SODIUM SERPL-SCNC: 140 MMOL/L (ref 136–145)
WBC # BLD AUTO: 21.4 THOU/UL (ref 4.8–10.8)

## 2020-02-17 RX ADMIN — ACETAMINOPHEN AND CODEINE PHOSPHATE PRN TAB: 300; 30 TABLET ORAL at 18:39

## 2020-02-17 RX ADMIN — Medication SCH: at 09:57

## 2020-02-17 RX ADMIN — ACETAMINOPHEN AND CODEINE PHOSPHATE PRN TAB: 300; 30 TABLET ORAL at 12:04

## 2020-02-17 RX ADMIN — DOCUSATE CALCIUM SCH MG: 240 CAPSULE, LIQUID FILLED ORAL at 09:52

## 2020-02-17 NOTE — PRG
DATE OF SERVICE:  02/17/2020



SUBJECTIVE:  Patient was seen and examined at bedside and overnight events noted. 



Patient denies any shortness of breath or chest pain or palpitation. 



No history of nausea or vomiting or diarrhea or fever or chills or cramps.



OBJECTIVE:  GENERAL:  This is a well-built female, in no apparent distress. 

VITAL SIGNS:  Temperature 98.7.  Heart rate 97.  Respiratory rate 22.  Blood

pressure 140/78. 

HEENT:  Atraumatic, normocephalic.  Oral mucosa is moist 

NECK:  Supple. 

CARDIOVASCULAR:  S1, S2 heard.  Rate and rhythm regular. 

RESPIRATORY:  Clear to auscultation. 

GASTROINTESTINAL:  Abdomen is soft. 

MUSCULOSKELETAL:  No tenderness.  No edema. 

DERMATOLOGIC:  No skin rash. 

NEUROLOGIC:  Alert and awake and oriented X3.  No focal neurologic deficits. Moving

all the extremities. 

PSYCHIATRIC:  Mood and affect normal.



LABORATORY DATA:  Potassium 4.4, BUN is 33, creatinine is 2.14.



ASSESSMENT AND PLAN:  

1. Acute kidney injury on chronic kidney disease stage 3 with a renal function seems

to be stable.  She is making a lot of urine.  We will monitor. 

2. Proteinuria.

3. Obstructive uropathy.  Follow up with Urology.

4. Hypertension.

5. Anemia.

6. Hypoalbuminemia. 

Avoid nephrotoxins and we will monitor.  We will reduce IV fluids 75 mL/h.  We will

follow. 





Job ID:  [id number]

## 2020-02-17 NOTE — PRG
DATE OF SERVICE:  2020



OBJECTIVE AND SUBJECTIVE:  The patient is alert and awake.  Does not appear to 
be in

acute distress.  Vital signs are T-max of 100.2, T current 98, blood pressure

132/86.  I's and O's 2589 out.  Urine output 4980.  Urine output is dilute 
yellow.

She is negative 2.3 L. 



LABORATORY DATA:  Hemoglobin this morning is 7.4.  There is no BMP profile 
ordered

for this morning. 



IMPRESSION AND PLAN:  

1. A 20-year-old female,  1, para 1, postoperative day #2 status post

 due to preeclampsia, proteinuria.  Urologic consultation initially

obtained due to left hydronephrosis, with decreased urine output.  This has

resolved.  Moreover, transvaginal abdominal ultrasound performed yesterday at

bedside demonstrates bilateral efflux.  Continue medical management, the 
patient in

ICU due to postoperative anemia. 

2. History of Escherichia coli urinary tract infection few years ago.  This 
warrants

outpatient workup.  Continue indwelling Brown catheter for now.   will follow. 







Job ID:  [id number]



[facility]

## 2020-02-17 NOTE — PRG
DATE OF SERVICE:  2020



SUBJECTIVE:  Izabel Zepeda has no complaints.  Her hemodynamics have been stable.



OBJECTIVE:  LUNGS:  Clear. 

HEART:  Regular rhythm. 

ABDOMEN:  Still distended.  After , it is nontender. 

EXTREMITIES:  Without edema.



LABORATORY DATA:  White count 21.4.  Her band count is down to 17% today.  She did

have 1% myelocytes on her peripheral smear, but I suspect that this is an acute

phase reaction to her critical illness.  Hemoglobin is 8.5 on her CBC for the manual

differential.  Electrolytes are normal.  Creatinine is stable at 2.14.  Magnesium is

down to 4.1 last night. 



IMPRESSION:  

1. Peripartum hemorrhage.

2. Preeclampsia.

3. Status post  section.

4. Left shift with no clinical indication of an acute infectious process.  This will

need to be followed after she moves out of critical care unit. 



At this point in time, she is stable from a critical care standpoint.  She is stable

to move out of critical care unit.  We will sign off. 







Job ID:  [id number]

## 2020-02-18 LAB
ALBUMIN SERPL BCG-MCNC: 2.4 G/DL (ref 3.5–5)
ALP SERPL-CCNC: 238 U/L (ref 40–110)
ALT SERPL W P-5'-P-CCNC: 12 U/L (ref 8–55)
ANION GAP SERPL CALC-SCNC: 16 MMOL/L (ref 10–20)
AST SERPL-CCNC: 24 U/L (ref 5–34)
BILIRUB SERPL-MCNC: 0.5 MG/DL (ref 0.2–1.2)
BUN SERPL-MCNC: 26 MG/DL (ref 7–18.7)
CALCIUM SERPL-MCNC: 9 MG/DL (ref 7.8–10.44)
CHLORIDE SERPL-SCNC: 104 MMOL/L (ref 98–107)
CO2 SERPL-SCNC: 19 MMOL/L (ref 22–29)
CREAT CL PREDICTED SERPL C-G-VRATE: 47 ML/MIN (ref 70–130)
GLOBULIN SER CALC-MCNC: 3.2 G/DL (ref 2.4–3.5)
GLUCOSE SERPL-MCNC: 119 MG/DL (ref 70–105)
HGB BLD-MCNC: 8.6 G/DL (ref 12–16)
MCH RBC QN AUTO: 31.4 PG (ref 27–31)
MCV RBC AUTO: 90.1 FL (ref 78–98)
MDIFF COMPLETE?: YES
PLATELET # BLD AUTO: 242 THOU/UL (ref 130–400)
POLYCHROMASIA BLD QL SMEAR: (no result) (100X)
POTASSIUM SERPL-SCNC: 4.8 MMOL/L (ref 3.5–5.1)
RBC # BLD AUTO: 2.74 MILL/UL (ref 4.2–5.4)
SODIUM SERPL-SCNC: 134 MMOL/L (ref 136–145)
WBC # BLD AUTO: 19.8 THOU/UL (ref 4.8–10.8)

## 2020-02-18 RX ADMIN — DOCUSATE CALCIUM SCH: 240 CAPSULE, LIQUID FILLED ORAL at 01:21

## 2020-02-18 RX ADMIN — HYDROCODONE BITARTRATE AND ACETAMINOPHEN PRN TAB: 5; 325 TABLET ORAL at 17:53

## 2020-02-18 RX ADMIN — Medication SCH: at 21:25

## 2020-02-18 RX ADMIN — Medication SCH: at 08:36

## 2020-02-18 RX ADMIN — ACETAMINOPHEN AND CODEINE PHOSPHATE PRN TAB: 300; 30 TABLET ORAL at 06:14

## 2020-02-18 RX ADMIN — ACETAMINOPHEN AND CODEINE PHOSPHATE PRN TAB: 300; 30 TABLET ORAL at 10:41

## 2020-02-18 RX ADMIN — DOCUSATE CALCIUM SCH MG: 240 CAPSULE, LIQUID FILLED ORAL at 21:14

## 2020-02-18 RX ADMIN — Medication SCH: at 01:21

## 2020-02-18 RX ADMIN — DOCUSATE CALCIUM SCH MG: 240 CAPSULE, LIQUID FILLED ORAL at 08:19

## 2020-02-18 NOTE — PRG
DATE OF SERVICE:  2020



SUBJECTIVE:  The patient without complaints.  Alert and awake.



OBJECTIVE:  VITAL SIGNS:  Stable.  She is afebrile.  I's and O's 2530 in, 5 L 
out.

She is negative 2.5 L. 



LABORATORY DATA:  AM labs pending.  Creatinine yesterday is 2.1.  Urine culture

negative. 



IMPRESSION:  

1. Ms. Zepeda is a 20-year-old female, G1, P1, status post  section

secondary to proteinuria, concern for preeclampsia, history of an oliguria, with

left hydronephrosis, likely due to physiologic hydronephrosis of pregnancy.

Abdominal transvaginal ultrasound demonstrated bilateral efflux.  Her oliguria 
has

resolved with observation. 

2. Renal insufficiency.

3. History of Escherichia coli urinary tract infection, urosepsis few years ago.

4. Nonobstructing right punctate renal lithiasis.  Discontinue Brown.  The 
patient

maybe incontinent due to high urine output.  Monitor BMP, potassium, magnesium 
also

needs to be monitored.  As an outpatient.   will work her up for possible

vesicoureteral reflux.  This is not indicated at this time. 







Job ID:  [id number]



[facility]

## 2020-02-18 NOTE — PRG
DATE OF SERVICE:  02/18/2020



SUBJECTIVE:  Patient was seen and examined at bedside and overnight events noted. 



Patient denies any shortness of breath or chest pain or palpitation. 



No history of nausea or vomiting or diarrhea or fever or chills or cramps.



OBJECTIVE:  GENERAL:  This is a well-built female, in no apparent distress. 

VITAL SIGNS:  Temperature 98.4.  Heart rate 93.  Respiratory rate 20.  Blood

pressure 136/84. 

HEENT:  Atraumatic, normocephalic.  Oral mucosa is moist 

NECK:  Supple. 

CARDIOVASCULAR:  S1, S2 heard.  Rate and rhythm regular. 

RESPIRATORY:  Clear to auscultation. 

GASTROINTESTINAL:  Abdomen is soft. 

MUSCULOSKELETAL:  No tenderness.  No edema. 

DERMATOLOGIC:  No skin rash. 

NEUROLOGIC:  Alert and awake and oriented X3.  No focal neurologic deficits. Moving

all the extremities. 

PSYCHIATRIC:  Mood and affect normal.



LABORATORY DATA:  Potassium 4.8, BUN is 26, creatinine is 1.78.



ASSESSMENT AND PLAN:  

1. Acute kidney injury, getting better.  Chronic kidney disease, stage 3, monitor.

2. Proteinuria.  The patient was advised to follow with Dr. Dumas as outpatient.

3. Obstructive uropathy, monitor.

4. Hypertension.

5. Anemia.

6. Hypoalbuminemia.

7. Creatinine is better.  Avoid nephrotoxins and need close followup as outpatient.  

She is at high risk for chronic complications.  Need to repeat proteinuria workup

postpartum. 







Job ID:  [id number]

## 2020-02-19 LAB
ALBUMIN SERPL BCG-MCNC: 2.8 G/DL (ref 3.5–5)
ALP SERPL-CCNC: 270 U/L (ref 40–110)
ALT SERPL W P-5'-P-CCNC: 14 U/L (ref 8–55)
ANION GAP SERPL CALC-SCNC: 15 MMOL/L (ref 10–20)
ANISOCYTOSIS BLD QL SMEAR: (no result) (100X)
AST SERPL-CCNC: 21 U/L (ref 5–34)
BILIRUB SERPL-MCNC: 0.7 MG/DL (ref 0.2–1.2)
BUN SERPL-MCNC: 27 MG/DL (ref 7–18.7)
CALCIUM SERPL-MCNC: 9.6 MG/DL (ref 7.8–10.44)
CHLORIDE SERPL-SCNC: 100 MMOL/L (ref 98–107)
CO2 SERPL-SCNC: 25 MMOL/L (ref 22–29)
CREAT CL PREDICTED SERPL C-G-VRATE: 49 ML/MIN (ref 70–130)
GLOBULIN SER CALC-MCNC: 3.5 G/DL (ref 2.4–3.5)
GLUCOSE SERPL-MCNC: 103 MG/DL (ref 70–105)
HGB BLD-MCNC: 10.2 G/DL (ref 12–16)
MCH RBC QN AUTO: 30.5 PG (ref 27–31)
MCV RBC AUTO: 91 FL (ref 78–98)
MDIFF COMPLETE?: YES
PLATELET # BLD AUTO: 363 THOU/UL (ref 130–400)
POLYCHROMASIA BLD QL SMEAR: (no result) (100X)
POTASSIUM SERPL-SCNC: 4.6 MMOL/L (ref 3.5–5.1)
RBC # BLD AUTO: 3.36 MILL/UL (ref 4.2–5.4)
SODIUM SERPL-SCNC: 135 MMOL/L (ref 136–145)
TARGETS BLD QL SMEAR: (no result) (100X)
WBC # BLD AUTO: 22.2 THOU/UL (ref 4.8–10.8)

## 2020-02-19 RX ADMIN — HYDROCODONE BITARTRATE AND ACETAMINOPHEN PRN TAB: 5; 325 TABLET ORAL at 02:02

## 2020-02-19 RX ADMIN — HYDROCODONE BITARTRATE AND ACETAMINOPHEN PRN TAB: 5; 325 TABLET ORAL at 07:25

## 2020-02-19 RX ADMIN — DOCUSATE CALCIUM SCH MG: 240 CAPSULE, LIQUID FILLED ORAL at 09:05

## 2020-02-19 RX ADMIN — HYDROCODONE BITARTRATE AND ACETAMINOPHEN PRN TAB: 5; 325 TABLET ORAL at 16:33

## 2020-02-19 RX ADMIN — Medication SCH: at 09:26

## 2020-02-19 RX ADMIN — HYDROCODONE BITARTRATE AND ACETAMINOPHEN PRN TAB: 5; 325 TABLET ORAL at 21:14

## 2020-02-19 RX ADMIN — DOCUSATE CALCIUM SCH MG: 240 CAPSULE, LIQUID FILLED ORAL at 21:14

## 2020-02-19 NOTE — PRG
DATE OF SERVICE:  2020



SUBJECTIVE:  The patient without complaints, and a.m. labs not yet finalized.  The

patient without complaints, her urine output is 3900 mL.  White count yesterday is

19, hemoglobin 8.6.  Creatinine yesterday is 1.7.  Highest creatinine on this

admission is 2.2. 



IMPRESSION AND PLAN:  

1. A 20-year-old female, status post  section.

2. History of oliguria, resolved, currently with polyuria, renal insufficiency.

3. History of Escherichia coli urinary tract infection, urosepsis in 2017.

4. History of nonobstructing right punctate renal lithiasis.  Monitor BMP. 



From urologic perspective, if her creatinine is stable, we will sign off.  The

patient can follow up with me as an outpatient in few weeks.  Elective workup for

VCUG, which I would prefer in few months as she is postpartum.  No need for

prophylactic antibiotic from urologic perspective. 







Job ID:  [id number]

## 2020-02-19 NOTE — PRG
DATE OF SERVICE:  02/19/2020



SUBJECTIVE:  Patient was seen and examined at bedside and overnight events noted. 



Patient denies any shortness of breath or chest pain or palpitation. 



No history of nausea or vomiting or diarrhea or fever or chills or cramps.



OBJECTIVE:  GENERAL:  This is a well-built female, in no apparent distress. 

VITAL SIGNS:  Temperature 98.3.  Heart rate 109.  Respiratory rate 20.  Blood

pressure 138/89. 

HEENT:  Atraumatic, normocephalic.  Oral mucosa is moist 

NECK:  Supple. 

CARDIOVASCULAR:  S1, S2 heard.  Rate and rhythm regular. 

RESPIRATORY:  Clear to auscultation. 

GASTROINTESTINAL:  Abdomen is soft. 

MUSCULOSKELETAL:  No tenderness.  No edema. 

DERMATOLOGIC:  No skin rash. 

NEUROLOGIC:  Alert and awake and oriented X3.  No focal neurologic deficits. Moving

all the extremities. 

PSYCHIATRIC:  Mood and affect normal.



LABORATORY DATA:  Potassium 4.6, BUN is 27, creatinine is 1.7.



ASSESSMENT AND PLAN:  

1. Acute kidney injury, much better.

2. Chronic kidney disease, stage 3.

3. Proteinuria.

4. Obstructive uropathy.

5. Hypoalbuminemia.

6. Creatinine is better.  Avoid nephrotoxins.

7. Close followup as outpatient.





Job ID:  [id number]

## 2020-02-19 NOTE — RAD
XR Chest Pa   Lat STANDARD



HISTORY: Fever



COMPARISON: None



FINDINGS: The heart size is normal. The lungs are well expanded without focal areas of consolidation,
 pneumothorax or pleural effusions.

There is a plate of linear atelectasis in the left midlung.



IMPRESSION: No evidence of pneumonia.



Reported By: Jl Degroot 

Electronically Signed:  2/19/2020 6:30 PM

## 2020-02-20 LAB
ALBUMIN SERPL BCG-MCNC: 2.7 G/DL (ref 3.5–5)
ALP SERPL-CCNC: 238 U/L (ref 40–110)
ALT SERPL W P-5'-P-CCNC: 20 U/L (ref 8–55)
ANION GAP SERPL CALC-SCNC: 13 MMOL/L (ref 10–20)
AST SERPL-CCNC: 25 U/L (ref 5–34)
BILIRUB SERPL-MCNC: 0.6 MG/DL (ref 0.2–1.2)
BUN SERPL-MCNC: 30 MG/DL (ref 7–18.7)
CALCIUM SERPL-MCNC: 9.6 MG/DL (ref 7.8–10.44)
CHLORIDE SERPL-SCNC: 101 MMOL/L (ref 98–107)
CO2 SERPL-SCNC: 26 MMOL/L (ref 22–29)
CREAT CL PREDICTED SERPL C-G-VRATE: 49 ML/MIN (ref 70–130)
GLOBULIN SER CALC-MCNC: 3.4 G/DL (ref 2.4–3.5)
GLUCOSE SERPL-MCNC: 107 MG/DL (ref 70–105)
HGB BLD-MCNC: 9.3 G/DL (ref 12–16)
MCH RBC QN AUTO: 31 PG (ref 27–31)
MCV RBC AUTO: 88.9 FL (ref 78–98)
MDIFF COMPLETE?: YES
PLATELET # BLD AUTO: 299 THOU/UL (ref 130–400)
POLYCHROMASIA BLD QL SMEAR: (no result) (100X)
POTASSIUM SERPL-SCNC: 4.6 MMOL/L (ref 3.5–5.1)
RBC # BLD AUTO: 3.01 MILL/UL (ref 4.2–5.4)
SODIUM SERPL-SCNC: 135 MMOL/L (ref 136–145)
WBC # BLD AUTO: 26 THOU/UL (ref 4.8–10.8)

## 2020-02-20 RX ADMIN — DOCUSATE CALCIUM SCH MG: 240 CAPSULE, LIQUID FILLED ORAL at 09:36

## 2020-02-20 RX ADMIN — HYDROCODONE BITARTRATE AND ACETAMINOPHEN PRN TAB: 5; 325 TABLET ORAL at 11:32

## 2020-02-20 RX ADMIN — METRONIDAZOLE SCH MLS: 500 INJECTION, SOLUTION INTRAVENOUS at 19:59

## 2020-02-20 RX ADMIN — DOCUSATE CALCIUM SCH MG: 240 CAPSULE, LIQUID FILLED ORAL at 19:52

## 2020-02-20 RX ADMIN — HYDROCODONE BITARTRATE AND ACETAMINOPHEN PRN TAB: 5; 325 TABLET ORAL at 19:53

## 2020-02-20 RX ADMIN — METRONIDAZOLE SCH MLS: 500 INJECTION, SOLUTION INTRAVENOUS at 09:53

## 2020-02-20 NOTE — ULT
EXAM:

Bilateral lower extremity venous Doppler US



HISTORY:

bilateral lower extremity edema and pain



FINDINGS:

Grayscale, color-flow, Doppler evaluation, spectral analysis of the bilateral lower extremities venou
s structures is performed with 2-D imaging. The bilateral common femoral, superficial femoral,

popliteal, posterior tibial, proximal greater saphenous and profunda femoral veins are imaged.



There is normal luminal compressibility, flow, and augmentation in the visualized deep venous structu
res of the bilateral lower extremities.



IMPRESSION:

No evidence of a deep vein thrombosis in either lower extremity.



Reported By: Jl Degroot 

Electronically Signed:  2/20/2020 6:39 PM

## 2020-02-20 NOTE — CT
EXAM:

CT Abdomen Pelvis WO Con



PROVIDED CLINICAL HISTORY:

Postoperative fever. Patient is postpartum since 2/15/2020.



COMPARISON:

2020



FINDINGS:

Evaluation of the vascular structures and parenchymal organs is limited secondary to the lack of intr
avenous contrast.



Bibasilar atelectasis is present.



A nonobstructing superior pole right renal calculus is again seen. Mild left-sided hydronephrosis is 
present with caliectasis on the right. These findings do appear mildly improved when compared to

the prior study.



The liver, spleen, pancreas, and bilateral adrenal glands demonstrate grossly normal nonenhanced CT a
ppearance. There is gas is present urinary bladder which could be related to recent

catheterization. Clinical correlation is suggested.



The uterus remains enlarged likely due to postpartum state. Again noted is heterogeneity within the e
xpected location of the endometrial canal with increased density material as well as multiple gas

foci seen within the endometrial canal. Increased density material is most compatible with hemorrhage
 within the endometrial canal. Additional heterogeneity is likely due to postpartum state and

recent  section. Endometritis cannot be excluded based on CT evaluation.



Fluid and hemorrhage inferior to the right hepatic lobe does appear improved. There is persistent hyp
erdense as well as low-density material seen in the pelvis and adjacent to the uterus suggesting

hemorrhage which is not significantly changed when compared to the prior exam although may be mildly 
improved in the lower pelvis.



There has been improvement in subcutaneous emphysema. Horizontally oriented skin clips with adjacent 
stranding in the lower pelvis is again seen related to patient's prior  section. Gas within

the anterolateral pelvis bilaterally has improved.



No dilated loops of small bowel are seen.



No newly developed fluid collection is appreciated on this exam.



A small to moderate amount of retained fecal material is seen throughout the colon suggesting constip
ation.



No other interval change.



IMPRESSION:



1. Findings which are again compatible with patient's recent  section. Hemorrhage and fluid w
ithin the pelvis is again seen. The degree of hemorrhage is overall similar to prior study to

slightly improved with slight decrease in fluid adjacent to the inferior aspect right hepatic lobe an
d in the lower posterior pelvis.

2. Improvement in subcutaneous emphysema. There has also been improvement in gas within the anterolat
eral aspect of the pelvis bilaterally.

3. Stable enlargement of the uterus related to postpartum state. There is heterogeneous material with
 areas of increased density as well as foci of gas seen within the endometrial canal extending into

the region of the endocervical canal. Findings are probably secondary to postoperative changes, but e
ndometritis cannot be excluded based on this exam.

4. No new fluid collection is appreciated.

5. Stable nonobstructing right renal calculus.

6. Stable mild left hydronephrosis. However, the degree of calyceal dilatation has improved. Calyceal
 dilatation is likely related to the enlarged uterus and distended urinary bladder.



Reported By: Fernando Villalpando 

Electronically Signed:  2020 9:38 AM

## 2020-02-20 NOTE — PRG
DATE OF SERVICE:  02/20/2020



SUBJECTIVE:  Patient was seen and examined at bedside and overnight events noted. 



Patient denies any shortness of breath or chest pain or palpitation. 



No history of nausea or vomiting or diarrhea or fever or chills or cramps.



OBJECTIVE:  GENERAL:  This is a well-built female, in no apparent distress. 

VITAL SIGNS:  Temperature 98.4.  Heart rate 120.  Respiratory rate 20.  Blood

pressure 121/75. 

HEENT:  Atraumatic, normocephalic.  Oral mucosa is moist 

NECK:  Supple. 

CARDIOVASCULAR:  S1, S2 heard.  Rate and rhythm regular. 

RESPIRATORY:  Clear to auscultation. 

GASTROINTESTINAL:  Abdomen is soft. 

MUSCULOSKELETAL:  No tenderness.  No edema. 

DERMATOLOGIC:  No skin rash. 

NEUROLOGIC:  Alert and awake and oriented X3.  No focal neurologic deficits. Moving

all the extremities. 

PSYCHIATRIC:  Mood and affect normal.



LABORATORY DATA:  Potassium 4.6, BUN is 30, creatinine is 1.7.



ASSESSMENT AND PLAN:  

1. Acute kidney injury on chronic kidney disease stage 3.  Renal function is better

and close to baseline. 

2. Proteinuria.  Need close followup as outpatient.

3. Obstructive uropathy.  Follow up with urology.

4. Hypoalbuminemia.

5. Edema, controlled.

6. Hyponatremia.

7. Anemia of chronic disease.

8. Avoid nephrotoxins.  We will monitor renal function.  We will follow.







Job ID:  [id number]

## 2020-02-21 LAB
ALBUMIN SERPL BCG-MCNC: 2.8 G/DL (ref 3.5–5)
ALP SERPL-CCNC: 243 U/L (ref 40–110)
ALT SERPL W P-5'-P-CCNC: 18 U/L (ref 8–55)
ANION GAP SERPL CALC-SCNC: 19 MMOL/L (ref 10–20)
AST SERPL-CCNC: 21 U/L (ref 5–34)
BILIRUB SERPL-MCNC: 0.6 MG/DL (ref 0.2–1.2)
BUN SERPL-MCNC: 31 MG/DL (ref 7–18.7)
CALCIUM SERPL-MCNC: 9.8 MG/DL (ref 7.8–10.44)
CHLORIDE SERPL-SCNC: 100 MMOL/L (ref 98–107)
CO2 SERPL-SCNC: 20 MMOL/L (ref 22–29)
CREAT CL PREDICTED SERPL C-G-VRATE: 50 ML/MIN (ref 70–130)
GLOBULIN SER CALC-MCNC: 3.7 G/DL (ref 2.4–3.5)
GLUCOSE SERPL-MCNC: 114 MG/DL (ref 70–105)
POTASSIUM SERPL-SCNC: 3.8 MMOL/L (ref 3.5–5.1)
SODIUM SERPL-SCNC: 135 MMOL/L (ref 136–145)

## 2020-02-21 RX ADMIN — DOCUSATE CALCIUM SCH MG: 240 CAPSULE, LIQUID FILLED ORAL at 07:57

## 2020-02-21 RX ADMIN — Medication SCH ML: at 21:20

## 2020-02-21 RX ADMIN — METRONIDAZOLE SCH MLS: 500 INJECTION, SOLUTION INTRAVENOUS at 07:55

## 2020-02-21 RX ADMIN — MEROPENEM AND SODIUM CHLORIDE SCH MLS: 1 INJECTION, SOLUTION INTRAVENOUS at 11:43

## 2020-02-21 RX ADMIN — DOCUSATE CALCIUM SCH MG: 240 CAPSULE, LIQUID FILLED ORAL at 21:19

## 2020-02-21 RX ADMIN — LINEZOLID SCH MLS: 600 INJECTION, SOLUTION INTRAVENOUS at 21:20

## 2020-02-21 NOTE — CON
DATE OF CONSULTATION:  2020



REASON FOR CONSULTATION:  Complications following a  with fever and

leukocytosis. 



HISTORY OF PRESENT ILLNESS:  A 20-year-old who has a history of renal 
insufficiency

and 38 weeks gestation with proteinuria and gestational diabetes.  She was 
initially

seen by Dr. Marshall because of elevated blood pressure and proteinuria noticed 
in the

office.  The patient was given IV fluids and serial blood pressures were 
monitored.

The patient was kept on Macrodantin and renal ultrasound was completed and it 
showed

a relatively small right kidney with no obstruction and moderate to severe

left-sided hydronephrosis with prominent dilation of proximal left ureter.  It 
was

observed that typically in pregnancy, the right side is more commonly affected 
by

this finding, which would raise the possibility of an alternate problem.  So

decision was made for induction of labor on .  The meconium was

detected.  On the , the patient had a low transverse  section by Dr. Tunrer.  The procedure was reviewed and nothing appeared to be out of the 
ordinary

process.  On , there was a decrease in the hemoglobin to 5.6, 
platelets

were normal.  Creatinine went up to 2.29.  The patient had a pelvic transvaginal

ultrasound which showed limited visualization of urinary bladder.  Both ureteral

jets were visualized.  An abdomen and pelvis CT was completed and it showed free

fluid in the left upper quadrant, bilateral paracolic gutters, intermixed areas 
of

hyperdensity consistent with extensive hemoperitoneum.  A stable hematoma 
within the

pelvis measuring 9 x 3.6 cm.  Consultation with Urology was obtained and Dr. Agrawal noticed improvement in urine output.  No surgical intervention was

recommended at the time.  Dr. Dumas was consulted and his impression was acute on

chronic kidney disease, multifactorial.  Some changes were made on the IV 
fluids.

Pulmonary consultation was obtained and this was mostly concerning the changes 
in

the peripheral blood picture.  A venogram did not demonstrate any evidence of 
deep

vein thrombosis.  Throughout the hospital stay, there was a persistence of

leukocytosis with worsening up to 26,000 yesterday.  This was associated with

bandemia which peaked at 38% and now is down to 18%.  Currently, Ms. Zepeda is

awake.  She appears to be concerned particularly with going home.  Apparently, 
her

child is at home.  She denies any headaches.  No visual symptoms.  No shortness 
of

breath or chest pain.  She does have a moderate to severe lower abdominal

tenderness.  She is voiding without difficulty.  She is able to move extremities

well. 



PAST MEDICAL HISTORY:  There is a history of urinary infection in 2017 with 
sepsis

reportedly and chronic renal insufficiency of unclear etiology.  She may have

vesicoureteral reflux with chronic pyelonephritis, but that is not clarified in 
the

history. 



PAST SURGICAL HISTORY:  Adenoidectomy and tympanostomy tubes.



SOCIAL HISTORY:  Never smoker.  She lives in the area with a boyfriend.  
Apparently,

she is estranged from her father.  Does not have a lot a family support. 



FAMILY HISTORY:  Includes diabetes mellitus type 2 and some form of renal

insufficiency. 



ALLERGIES:  NONE.



CURRENT MEDICATIONS:  She had been on

1. Rocephin.

2. Flagyl.

3. Tylenol.

4. Dulcolax.

5. Benadryl.



PHYSICAL EXAMINATION:

VITAL SIGNS:  T-max 101.2.  She was 101.4 the day before.  /80, pulse 113,

respirations 20, O2 saturation 98. 

GENERAL:  She is thin.  Does not appear in distress.  Ocular movements 
conjugate.

No lymphadenopathy.  The  incision appears normal. 

NECK:  Supple.  No jugular vein distention. 

LUNGS:  Symmetric air entry. 

HEART:  S1-S2 regular rate.  No S3 or S4. 

ABDOMEN:  Soft with tenderness which is marked in the lower abdominal area in 
the

suprapubic region.  This tenderness is to palpation and as well as percussion. 

EXTREMITY:  There is no joint inflammatory activity.  No edema.  Pulses 1+ in

dorsalis pedis.  She moves extremities equally. 

NEURO:  She is awake, oriented, follows commands.



LABORATORY DATA:  White cell count is up to 26,000, hemoglobin 9.3, platelets 
299,

18% bands, 71% neutrophils.  Sodium 135, creatinine 1.67.  Liver profile with 
normal

transaminases and bilirubin.  Alkaline phosphatase as expected moderately 
elevated.

Albumin is down to 2.8.  Urinalysis with normal findings from February 15 and

hepatitis surface antigen, syphilis antibody testing negative.  Microbiology, 
have

one urine sample with low numbers of group G strep.  This is of clean-catch and

probably represents contamination of the sample.  The two sets of blood cultures
, no

growth at 48 hours. Urine culture no growth at 48 hours as well.  Pathology of 
the

placenta was not particularly remarkable.  There was evidence of meconium 
staining. 



ASSESSMENT:  

1. History of prior urinary tract infection, possible vesicoureteral ureteral 
reflux

with chronic renal insufficiency, gestational diabetes, hydronephrosis. 

2. Hematoma in the abdominal area as well as in the pelvic region with 
tenderness on

palpation as well as hemoperitoneum

3. Neutrophilia with bandemia.

4. Fever.



DISCUSSION:  The differential diagnosis includes infected hematoma with the 
usual

pathogens including Staphylococcus aureus/MRSA, gram negatives including 
resistant

gram negatives, anaerobes and streptococci as well as mycoplasma species.  
Hematomas

can cause fever without superimposed infection, but she does have persistence of

neutrophilia with bandemia, until proven otherwise, we have to treat this is as 
an

infected process.  The urinary tract infection with pyelonephritis is not ruled 
out

but appears to be less likely in view of the normal urinalysis. An auto-immune 
process as

well as microangiopathy with hemolysis are not apparent.  At this point, we

will transition to a broad-spectrum coverage to include MDRO's in the coverage 
and

observe clinical response.  May need follow up imaging studies.  The endpoint 
here

would be improvement and resolution of the pain as well as improvement of the

neutrophil count and differential.  Once there is improvement, then transition 
to

oral antimicrobial therapy would be attempted in preparation for discharge 
planning. 







Job ID:  [id number]



[facility]

## 2020-02-21 NOTE — PRG
DATE OF SERVICE:  02/21/2020



SUBJECTIVE:  Patient was seen and examined at bedside and overnight events noted. 



Patient denies any shortness of breath or chest pain or palpitation. 



No history of nausea or vomiting or diarrhea or fever or chills or cramps.



OBJECTIVE:  GENERAL:  This is a well-built female, in no apparent distress. 

VITAL SIGNS:  Temperature 99.5.  Heart rate 124.  Respiratory rate 20.  Blood

pressure 120/70. 

HEENT:  Atraumatic, normocephalic.  Oral mucosa is moist. 

NECK:  Supple. 

CARDIOVASCULAR:  S1, S2 heard.  Rate and rhythm regular. 

RESPIRATORY:  Clear to auscultation. 

GASTROINTESTINAL:  Abdomen is soft. 

MUSCULOSKELETAL:  No tenderness.  No edema. 

DERMATOLOGIC:  No skin rash. 

NEUROLOGIC:  Alert and awake and oriented x3.  No focal neurologic deficits.  Moving

all the extremities. 

PSYCHIATRIC:  Mood and affect normal.



LABORATORY DATA:  Potassium 3.8, BUN is 31, and creatinine is 1.67.



ASSESSMENT AND PLAN:  

1. Acute kidney injury, much better.

2. Chronic kidney disease, stage 3.

3. Proteinuria.

4. Urinary tract infection.

5. Edema.

6. Hyponatremia.

7. Anemia of chronic disease. 



Renal function is stable.







Job ID:  [id number]

## 2020-02-22 LAB
ANION GAP SERPL CALC-SCNC: 15 MMOL/L (ref 10–20)
BUN SERPL-MCNC: 28 MG/DL (ref 7–18.7)
CALCIUM SERPL-MCNC: 9.6 MG/DL (ref 7.8–10.44)
CHLORIDE SERPL-SCNC: 101 MMOL/L (ref 98–107)
CO2 SERPL-SCNC: 21 MMOL/L (ref 22–29)
CREAT CL PREDICTED SERPL C-G-VRATE: 57 ML/MIN (ref 70–130)
GLUCOSE SERPL-MCNC: 114 MG/DL (ref 70–105)
HGB BLD-MCNC: 9.9 G/DL (ref 12–16)
MCH RBC QN AUTO: 31.2 PG (ref 27–31)
MCV RBC AUTO: 89.5 FL (ref 78–98)
MDIFF COMPLETE?: YES
PLATELET # BLD AUTO: 518 THOU/UL (ref 130–400)
POLYCHROMASIA BLD QL SMEAR: (no result) (100X)
POTASSIUM SERPL-SCNC: 3.4 MMOL/L (ref 3.5–5.1)
RBC # BLD AUTO: 3.15 MILL/UL (ref 4.2–5.4)
SODIUM SERPL-SCNC: 134 MMOL/L (ref 136–145)
WBC # BLD AUTO: 32.1 THOU/UL (ref 4.8–10.8)

## 2020-02-22 RX ADMIN — Medication SCH ML: at 09:58

## 2020-02-22 RX ADMIN — Medication SCH ML: at 21:28

## 2020-02-22 RX ADMIN — HYDROCODONE BITARTRATE AND ACETAMINOPHEN PRN TAB: 5; 325 TABLET ORAL at 08:34

## 2020-02-22 RX ADMIN — MEROPENEM AND SODIUM CHLORIDE SCH MLS: 1 INJECTION, SOLUTION INTRAVENOUS at 11:50

## 2020-02-22 RX ADMIN — MEROPENEM AND SODIUM CHLORIDE SCH MLS: 1 INJECTION, SOLUTION INTRAVENOUS at 01:22

## 2020-02-22 RX ADMIN — LINEZOLID SCH MLS: 600 INJECTION, SOLUTION INTRAVENOUS at 21:28

## 2020-02-22 RX ADMIN — DOCUSATE CALCIUM SCH: 240 CAPSULE, LIQUID FILLED ORAL at 08:43

## 2020-02-22 RX ADMIN — LINEZOLID SCH MLS: 600 INJECTION, SOLUTION INTRAVENOUS at 08:37

## 2020-02-22 RX ADMIN — DOCUSATE CALCIUM SCH: 240 CAPSULE, LIQUID FILLED ORAL at 21:20

## 2020-02-22 NOTE — PDOC.PP
Post Partum Progress Note


Post Partum Day #: 6


Subjective: 





States feeling ok


PO intake tolerated: yes


Flatus: yes


Ambulation: yes


 Vital Signs (12 hours)











  Temp Pulse Resp BP BP Pulse Ox


 


 20 05:53   124 H   118/68  


 


 20 00:30  99.4 F  128 H  28 H   127/79  98


 


 20 22:38   128 H   129/74  


 


 20 19:10       98


 


 20 19:06  99.1 F  128 H  20   128/74  98








 Weight











Weight                         132 lb











 Most Recent Monitor Data











Heart Rate from ECG            104


 


NIBP                           162/108


 


NIBP BP-Mean                   126


 


Respiration from ECG           29


 


SpO2                           98











Last temp was 100.4 on 20 at 1919





- Physical Examination


General: NAD


Cardiovascular: no m/r/g


Abdominal: + bowel sounds, no distention, appropriately TTP


Extremities: negative homans (B)


Skin: CS incision dry & intact (staples in use...C/D/I)


Neurological: no gross focal deficits


Psychiatric: A&Ox3, normal affect


Result Diagrams: 


 20 06:25





 20 05:35


Additional Labs: 


 Post Partum Labs











Blood Type  O POSITIVE   20  12:47    


 


Hep Bs Antigen  Non-Reactive S/CO (NonReactive)   20  12:47    











(1)  delivery delivered


Code(s): O82 - ENCOUNTER FOR  DELIVERY WITHOUT INDICATION   Status: 

Acute   





(2) Chronic kidney failure


Status: Acute   





(3) Peritoneal hematoma


Code(s): S36.81XA - INJURY OF PERITONEUM, INITIAL ENCOUNTER   Status: Acute   





- Assessment/Plan





Plan:


1. Renal: stable


2. fever: ID following. ABX are Linezoid, meropenem, po doxy. Last temp was 2/

20 pm...I discussed with her continued IV ABX until 48 hrs afebrile and then 

plan meds per ID.


3. Pelvic hematoma: just follow clinically: abd soft and nonsurgical


4. Labs: CBC and CMP pending this AM

## 2020-02-22 NOTE — PRG
DATE OF SERVICE:  2020



SUBJECTIVE:  Feeling better.  Appears comfortable.  Apparently, eating better.  No

chest pain.  Abdominal pain has improved.  No genitourinary symptoms.  Some

diarrhea, probably antibiotic related. 



OBJECTIVE:  VITAL SIGNS:  Temperature curve has improved. 

HEART:  S1 and S2.  Regular rate. 

LUNGS:  Clear. 

ABDOMEN:  Much less tender than yesterday.  The  incision appears normal,

dry and no erythema, very little induration. 

EXTREMITIES:  Moves extremities equally.



LABORATORY DATA:  White cell count is still up to 32,000, but bands are down to 15%,

not much, but some, continues to have steady drop in bands.  Creatinine 1.47.  GFR

at 45, which is improved.  Microbiology, no new findings. 



ASSESSMENT AND DISCUSSION:  Preeclampsia,  section, some meconium staining

with postop hemoperitoneum as well as pelvic hematoma, neutrophilia with bandemia

and fever.  The patient is being treated with presumption of infected hematoma.

Septic pelvic thrombophlebitis is less likely, but due to renal failure could not be

ruled out with contrast administration during the CT scan.  Overall improvement is

noticed and hopefully will start seeing a decrease in white cell count.  There is a

potential discharge planning for tomorrow.  The problem is going to be the

antimicrobial regimen for discharge planning.  Combination of cephalosporin plus

doxycycline would be an option.  In the absence of a firm diagnosis, there is a

chance for relapse of the process once the IV antimicrobials are discontinued

though.  It will depend on the extent that the laboratory findings improve by

tomorrow. 







Job ID:  [id number]

## 2020-02-22 NOTE — PRG
DATE OF SERVICE:  02/22/2020



SUBJECTIVE:  Patient was seen and examined at bedside and overnight events noted. 



Patient denies any shortness of breath or chest pain or palpitation. 



No history of nausea or vomiting or diarrhea or fever or chills or cramps.



OBJECTIVE:  GENERAL:  This is a well-built female, in no apparent distress. 

VITAL SIGNS:  Temperature 96, heart rate 106, respiratory rate 16, and blood

pressure 130/90. 

HEENT:  Atraumatic, normocephalic.  Oral mucosa is moist 

NECK:  Supple. 

CARDIOVASCULAR:  S1, S2 heard.  Rate and rhythm regular. 

RESPIRATORY:  Clear to auscultation. 

GASTROINTESTINAL:  Abdomen is soft. 

MUSCULOSKELETAL:  No tenderness.  No edema. 

DERMATOLOGIC:  No skin rash. 

NEUROLOGIC:  Alert and awake and oriented X3.  No focal neurologic deficits. Moving

all the extremities. 

PSYCHIATRIC:  Mood and affect normal.



LABORATORY DATA:  Potassium 3.4, BUN is 28, and creatinine is 1.4.



ASSESSMENT AND PLAN:  

1. Acute kidney injury, better.

2. Chronic kidney disease stage 3, better.

3. Hypokalemia, replace.

4. Urinary tract infection.  Continue antibiotics.

5. Edema.

6. Hyponatremia.

7. Renal function is much better.  Avoid nephrotoxins.





Job ID:  [id number]

## 2020-02-23 VITALS — TEMPERATURE: 99 F | DIASTOLIC BLOOD PRESSURE: 80 MMHG | SYSTOLIC BLOOD PRESSURE: 131 MMHG

## 2020-02-23 LAB
ANION GAP SERPL CALC-SCNC: 17 MMOL/L (ref 10–20)
BUN SERPL-MCNC: 25 MG/DL (ref 7–18.7)
CALCIUM SERPL-MCNC: 9.5 MG/DL (ref 7.8–10.44)
CHLORIDE SERPL-SCNC: 102 MMOL/L (ref 98–107)
CO2 SERPL-SCNC: 20 MMOL/L (ref 22–29)
CREAT CL PREDICTED SERPL C-G-VRATE: 60 ML/MIN (ref 70–130)
GLUCOSE SERPL-MCNC: 109 MG/DL (ref 70–105)
HGB BLD-MCNC: 9.9 G/DL (ref 12–16)
MCH RBC QN AUTO: 30.8 PG (ref 27–31)
MCV RBC AUTO: 89.3 FL (ref 78–98)
MDIFF COMPLETE?: YES
PLATELET # BLD AUTO: 578 THOU/UL (ref 130–400)
POTASSIUM SERPL-SCNC: 3.6 MMOL/L (ref 3.5–5.1)
RBC # BLD AUTO: 3.23 MILL/UL (ref 4.2–5.4)
SODIUM SERPL-SCNC: 135 MMOL/L (ref 136–145)
WBC # BLD AUTO: 34 THOU/UL (ref 4.8–10.8)

## 2020-02-23 RX ADMIN — DOCUSATE CALCIUM SCH: 240 CAPSULE, LIQUID FILLED ORAL at 09:26

## 2020-02-23 RX ADMIN — Medication SCH ML: at 09:26

## 2020-02-23 RX ADMIN — LINEZOLID SCH MLS: 600 INJECTION, SOLUTION INTRAVENOUS at 09:18

## 2020-02-23 RX ADMIN — MEROPENEM AND SODIUM CHLORIDE SCH MLS: 1 INJECTION, SOLUTION INTRAVENOUS at 00:56

## 2020-02-23 RX ADMIN — MEROPENEM AND SODIUM CHLORIDE SCH MLS: 1 INJECTION, SOLUTION INTRAVENOUS at 13:51

## 2020-02-23 NOTE — PRG
DATE OF SERVICE:  2020



SUBJECTIVE:  Doing well.  She has her baby with her at the moment.  No headaches.

No respiratory symptoms.  No abdominal pain.  Eating without problems.  Diarrhea has

improved. 



OBJECTIVE:  VITAL SIGNS:  T-max 99.4, blood pressure 130/88, pulse 118, respiratory

rate 24 to 28, O2 saturation 100. 

GENERAL:  Does not appear in distress. 

LUNGS:  Symmetric.  Clear breath sounds. 

ABDOMEN:  Soft,  site appears normal.  Tenderness in the suprapubic area

has diminished markedly.  The abdomen is soft. 

EXTREMITIES:  Moves extremities equally.



LABORATORY DATA:  White cell count still up to 34,000, hemoglobin 9.9, platelets are

up to 578.  The differential, however, has improved to 60% neutrophils and 6% bands.

 The creatinine is 1.4.  No new microbiology info. 



ASSESSMENT AND DISCUSSION:  

1. Preeclampsia.

2. .

3. Postop hemoperitoneum and pelvic hematoma.

4. Neutrophilia with bandemia and fever with presumption of infected hematoma,

septic pelvic thrombophlebitis is less likely, but not yet completely ruled out. 

5. Renal insufficiency which appears to be acute on chronic with improvement. 

At this moment, we will transition her to oral doxycycline, Omnicef.  Discontinue

the remainder antimicrobials and see how her white cell count behaves tomorrow,

clinical status and consider discharge planning.  There is still risk of more

complex process in the abdominal pelvic area.  In that case, she would need repeat

imaging study.  At this time, I would probably do an MRI without contrast or an MRI

with contrast, but hopefully that is not going to be necessary. 







Job ID:  [id number]

## 2020-02-23 NOTE — PRG
DATE OF SERVICE:  02/23/2020



SUBJECTIVE:  Patient was seen and examined at bedside and overnight events noted. 



Patient denies any shortness of breath or chest pain or palpitation. 



No history of nausea or vomiting or diarrhea or fever or chills or cramps.



OBJECTIVE:  GENERAL:  This is a well-built female, in no apparent distress. 

VITAL SIGNS:  Temperature 99.0.  Heart rate 118.  Respiratory rate 20.  Blood

pressure 130/80. 

HEENT:  Atraumatic, normocephalic.  Oral mucosa is moist. 

NECK:  Supple. 

CARDIOVASCULAR:  S1, S2 heard.  Rate and rhythm regular. 

RESPIRATORY:  Clear to auscultation. 

GASTROINTESTINAL:  Abdomen is soft. 

MUSCULOSKELETAL:  No tenderness.  No edema. 

DERMATOLOGIC:  No skin rash. 

NEUROLOGIC:  Alert and awake and oriented x3.  No focal neurologic deficits.  Moving

all the extremities. 

PSYCHIATRIC:  Mood and affect normal.



LABORATORY DATA:  Potassium is 3.6, BUN is 55, and creatinine is 1.4.



ASSESSMENT AND PLAN:  

1. Acute kidney injury, better.

2. Chronic kidney disease, stage 3, stable.

3. Hypokalemia, better.

4. Urinary tract infection.  Continue to follow with ID.

5. Edema, controlled.

6. Hyponatremia. 



Follow with ID.  Renal function is better.





Job ID:  [id number]

## 2020-02-24 NOTE — DIS
DATE OF ADMISSION:  2020



DATE OF DISCHARGE:  2020



DISCHARGE DIAGNOSES:  

1. 38-week intrauterine pregnancy status post delivery via primary  section

secondary to failed induction. 

2. Preeclampsia.

3. Acute renal failure superimposed on chronic renal failure.

4. Anemia secondary to blood loss.

5. Hemoperitoneum and pelvic hematoma postop.

6. Fever with neutrophilia and bandemia.



HISTORY:  Izabel is a 20-year-old Latin-American female, primigravida, followed in

my prenatal clinic throughout her pregnancy, who was admitted on 2020 from my

office at 38 weeks gestation upon notification of 2+ proteinuria with normal

pressures.  The patient was admitted to Labor and Delivery initially for observation

for evaluation for possible preeclampsia.  Her initial laboratory results revealed a

creatinine of 1.68; CBC, normal white count, hemoglobin 11, and normal platelets.

The patient was admitted initially for 24-hour urine studies, but upon further

consultation with Dr. Familia Marshall at OB labor, a decision was made to go ahead and

induce labor.  The patient underwent vaginal Cytotec followed by __________

following morning, followed by artificial rupture of membrane, which noted moderate

meconium fluid.  The patient progressed very slowly with minimal progress, past 5

cm, then noted later on hospital day #2 severe ranged blood pressures in the

160/110.  She was subsequently started on IV magnesium for preeclampsia.  The

patient had good urine output.  No other symptoms noted in the following morning,

non-reassuring fetal heart tones of approximately 45 minutes with an essentially

nonreactive fetal heart tone strip, although no deceleration.  Discussion made with

OB Labor as well as the patient and father of the baby.  Decision was made for

primary  section.  The patient underwent primary  section on

2020 without complications.  Quantitative blood loss was 300 mL.  The patient

went to Labor ICU for continuation of magnesium, but later on her magnesium level at

9.0, magnesium was subsequently discontinued.  The patient was then at that point

transferred to the medical ICU for closer monitoring of her acute with superimposed

chronic renal failure due to decreased urine output.  A consultation was obtained

with Dr. Dumas of Nephrology as well as Dr. Agrawal of Neurology.  Bladder

ultrasound revealed no obstructive uropathy.  A CT of her abdomen was obtained,

which also revealed some right hydronephrosis, but no obstruction.  The patient

remained fairly stable, but noted her hemoglobin dropped to 6.8 on postop day 1 and

down to 5.6 on postop day 2.  The patient was transfused 2 units of packed red blood

cells.  Repeat CT of her abdomen and pelvis revealed a stable hemoperitoneum as well

as hematoma just anterior to the __________ incision.  The patient remained

afebrile, although she had an elevated white count of 21 with 17 bands, which then

increased to 56 bands on .  She was in the CCU with consults with Dr. Pedraza,

but did not feel antibiotics were warranted, and the patient remained afebrile, most

likely due to a stress reaction.  She was subsequently transferred to the floor on

postop day #3.  Her hemoglobin stabilized; her white count stabilized; bandemia

continued, but stable.  Noted the patient developed a fever on postop day #5 of 101.

 A repeat CT of the abdomen was obtained to rule out abscess, which again revealed

stable changes with hemoperitoneum and hematoma.  Blood cultures and urine culture

were obtained.  Urine culture only grew strep group G, which is felt to be a

contaminant.  Chest x-ray was ordered, which was negative.  Bilateral venous

Dopplers were ordered, which were negative.  Consult obtained with Dr. Caputo.  The

patient had originally been started on IV Flagyl and Rocephin, and these antibiotics

were changed to IV Zosyn, meropenem, and p.o. doxycycline.  The patient became

afebrile within 36 hours.  She had some mild abdominal tenderness, which resolved.

She continued to have an elevated white count with bandemia, but clinically

improving.  She was changed to p.o. antibiotics of p.o. doxycycline and Omnicef on

the day of discharge with recommendations to follow overnight and repeat blood work

the following morning, but the patient is adamant to be discharged home due to

social situation with father of baby needing to return to work.  Multiple

discussions with patient regarding that we do recommend that she be observed

overnight.  She understands the risks involved with being discharged early.  Her

latest labs on  revealed creatinine of 1.40, GFR 47, hemoglobin of 9.9 with

white count of 34, 6 bands, and platelet count had increased from 299 on  to

578 on . 



DISCHARGE MEDICATIONS:  

1. Omnicef 300 mg p.o. b.i.d.

2. Doxycycline 100 mg p.o. b.i.d. x10 days.

3. Hydralazine 25 mg t.i.d.

4. Ferrous sulfate 325 b.i.d. 

Noted that prescription for tramadol had previously been sent to the pharmacy.  We

will remove her skin staples and apply Steri-Strips.  The patient to follow up with

me within 1 to 2 days. 







Job ID:  [id number]

## 2020-03-04 ENCOUNTER — HOSPITAL ENCOUNTER (EMERGENCY)
Dept: HOSPITAL 92 - ERS | Age: 20
Discharge: HOME | End: 2020-03-04
Payer: COMMERCIAL

## 2020-03-04 DIAGNOSIS — D72.829: ICD-10-CM

## 2020-03-04 LAB
ALBUMIN SERPL BCG-MCNC: 3.6 G/DL (ref 3.5–5)
ALP SERPL-CCNC: 147 U/L (ref 40–110)
ALT SERPL W P-5'-P-CCNC: 18 U/L (ref 8–55)
ANION GAP SERPL CALC-SCNC: 15 MMOL/L (ref 10–20)
AST SERPL-CCNC: 22 U/L (ref 5–34)
BACTERIA UR QL AUTO: (no result) HPF
BASOPHILS # BLD AUTO: 0.1 THOU/UL (ref 0–0.2)
BASOPHILS NFR BLD AUTO: 0.5 % (ref 0–1)
BILIRUB SERPL-MCNC: 0.5 MG/DL (ref 0.2–1.2)
BUN SERPL-MCNC: 17 MG/DL (ref 7–18.7)
CALCIUM SERPL-MCNC: 8.3 MG/DL (ref 7.8–10.44)
CHLORIDE SERPL-SCNC: 102 MMOL/L (ref 98–107)
CO2 SERPL-SCNC: 22 MMOL/L (ref 22–29)
CREAT CL PREDICTED SERPL C-G-VRATE: 0 ML/MIN (ref 70–130)
EOSINOPHIL # BLD AUTO: 0.2 THOU/UL (ref 0–0.7)
EOSINOPHIL NFR BLD AUTO: 1.1 % (ref 0–10)
GLOBULIN SER CALC-MCNC: 3.6 G/DL (ref 2.4–3.5)
GLUCOSE SERPL-MCNC: 94 MG/DL (ref 70–105)
HGB BLD-MCNC: 9.6 G/DL (ref 12–16)
LEUKOCYTE ESTERASE UR QL STRIP.AUTO: 500 LEU/UL
LYMPHOCYTES # BLD: 2.5 THOU/UL (ref 1.2–3.4)
LYMPHOCYTES NFR BLD AUTO: 14.5 % (ref 21–51)
MCH RBC QN AUTO: 30.8 PG (ref 27–31)
MCV RBC AUTO: 88.5 FL (ref 78–98)
MONOCYTES # BLD AUTO: 1.1 THOU/UL (ref 0.11–0.59)
MONOCYTES NFR BLD AUTO: 6.2 % (ref 0–10)
NEUTROPHILS # BLD AUTO: 13.6 THOU/UL (ref 1.4–6.5)
NEUTROPHILS NFR BLD AUTO: 77.7 % (ref 42–75)
PLATELET # BLD AUTO: 520 THOU/UL (ref 130–400)
POTASSIUM SERPL-SCNC: 3.4 MMOL/L (ref 3.5–5.1)
PROT UR STRIP.AUTO-MCNC: 50 MG/DL
RBC # BLD AUTO: 3.11 MILL/UL (ref 4.2–5.4)
RBC UR QL AUTO: (no result) HPF (ref 0–3)
SODIUM SERPL-SCNC: 136 MMOL/L (ref 136–145)
WBC # BLD AUTO: 17.5 THOU/UL (ref 4.8–10.8)

## 2020-03-04 PROCEDURE — 99283 EMERGENCY DEPT VISIT LOW MDM: CPT

## 2020-03-04 PROCEDURE — 81015 MICROSCOPIC EXAM OF URINE: CPT

## 2020-03-04 PROCEDURE — 80053 COMPREHEN METABOLIC PANEL: CPT

## 2020-03-04 PROCEDURE — 87086 URINE CULTURE/COLONY COUNT: CPT

## 2020-03-04 PROCEDURE — 87186 SC STD MICRODIL/AGAR DIL: CPT

## 2020-03-04 PROCEDURE — 87077 CULTURE AEROBIC IDENTIFY: CPT

## 2020-03-04 PROCEDURE — 87040 BLOOD CULTURE FOR BACTERIA: CPT

## 2020-03-04 PROCEDURE — 81003 URINALYSIS AUTO W/O SCOPE: CPT

## 2020-03-04 PROCEDURE — 87804 INFLUENZA ASSAY W/OPTIC: CPT

## 2020-03-04 PROCEDURE — 85025 COMPLETE CBC W/AUTO DIFF WBC: CPT

## 2020-03-04 PROCEDURE — 36415 COLL VENOUS BLD VENIPUNCTURE: CPT

## 2020-03-04 PROCEDURE — 83605 ASSAY OF LACTIC ACID: CPT

## 2020-10-20 ENCOUNTER — HOSPITAL ENCOUNTER (EMERGENCY)
Dept: HOSPITAL 92 - ERS | Age: 20
Discharge: HOME | End: 2020-10-20
Payer: COMMERCIAL

## 2020-10-20 DIAGNOSIS — R55: ICD-10-CM

## 2020-10-20 DIAGNOSIS — R42: ICD-10-CM

## 2020-10-20 DIAGNOSIS — Z3A.10: ICD-10-CM

## 2020-10-20 DIAGNOSIS — O99.891: Primary | ICD-10-CM

## 2020-10-20 LAB
BACTERIA UR QL AUTO: (no result) HPF
GLUCOSE SERPL-MCNC: 71 MG/DL (ref 70–105)
PROT UR STRIP.AUTO-MCNC: 50 MG/DL
RBC UR QL AUTO: (no result) HPF (ref 0–3)
SP GR UR STRIP: 1.01 (ref 1–1.04)
WBC UR QL AUTO: (no result) HPF (ref 0–3)

## 2020-10-20 PROCEDURE — 36415 COLL VENOUS BLD VENIPUNCTURE: CPT

## 2020-10-20 PROCEDURE — 82947 ASSAY GLUCOSE BLOOD QUANT: CPT

## 2020-10-20 PROCEDURE — 76856 US EXAM PELVIC COMPLETE: CPT

## 2020-10-20 PROCEDURE — 93976 VASCULAR STUDY: CPT

## 2020-10-20 PROCEDURE — 87086 URINE CULTURE/COLONY COUNT: CPT

## 2020-10-20 PROCEDURE — 93005 ELECTROCARDIOGRAM TRACING: CPT

## 2020-10-20 PROCEDURE — 81001 URINALYSIS AUTO W/SCOPE: CPT

## 2020-10-20 NOTE — ULT
EXAM: Pelvic ultrasound



HISTORY: Syncope in a pregnant female



COMPARISON: None



TECHNIQUE: Multiple grayscale and color Doppler images were obtained in a transabdominal pelvic ultra
sound. Spectral analysis of the Doppler waveforms of the ovaries were performed.





FINDINGS:

UTERUS: There is an intrauterine gestational sac. This contains a yolk sac and fetal pole.

Crown-rump length: 3.28 cm which estimates gestational age at 10 weeks 1 day.

A fetal heart rate is detected at 175 bpm.

No evidence of subchorionic hemorrhage.



No free fluid is seen in the pelvis. 



RIGHT OVARY: Normal flow without focal mass.

LEFT OVARY: Not seen.



IMPRESSION: Single live intrauterine pregnancy with estimated age of 10 weeks 1 day.



Reported By: Michele Sandoval 

Electronically Signed:  10/20/2020 2:26 PM

## 2021-02-12 ENCOUNTER — HOSPITAL ENCOUNTER (OUTPATIENT)
Dept: HOSPITAL 92 - BICULT | Age: 21
Discharge: HOME | End: 2021-02-12
Attending: OBSTETRICS & GYNECOLOGY
Payer: COMMERCIAL

## 2021-02-12 DIAGNOSIS — O09.92: Primary | ICD-10-CM

## 2021-02-12 DIAGNOSIS — Z3A.26: ICD-10-CM

## 2021-02-12 DIAGNOSIS — O40.2XX0: ICD-10-CM

## 2021-02-12 PROCEDURE — 76805 OB US >/= 14 WKS SNGL FETUS: CPT
